# Patient Record
Sex: FEMALE | Race: BLACK OR AFRICAN AMERICAN | NOT HISPANIC OR LATINO
[De-identification: names, ages, dates, MRNs, and addresses within clinical notes are randomized per-mention and may not be internally consistent; named-entity substitution may affect disease eponyms.]

---

## 2019-04-18 ENCOUNTER — APPOINTMENT (OUTPATIENT)
Dept: ANTEPARTUM | Facility: CLINIC | Age: 39
End: 2019-04-18
Payer: COMMERCIAL

## 2019-04-18 PROBLEM — Z00.00 ENCOUNTER FOR PREVENTIVE HEALTH EXAMINATION: Status: ACTIVE | Noted: 2019-04-18

## 2019-04-18 PROCEDURE — 76813 OB US NUCHAL MEAS 1 GEST: CPT

## 2019-04-23 ENCOUNTER — EMERGENCY (EMERGENCY)
Facility: HOSPITAL | Age: 39
LOS: 1 days | Discharge: HOME | End: 2019-04-23
Attending: EMERGENCY MEDICINE | Admitting: EMERGENCY MEDICINE
Payer: COMMERCIAL

## 2019-04-23 VITALS
SYSTOLIC BLOOD PRESSURE: 129 MMHG | WEIGHT: 149.91 LBS | DIASTOLIC BLOOD PRESSURE: 85 MMHG | HEIGHT: 64 IN | TEMPERATURE: 98 F | HEART RATE: 90 BPM | OXYGEN SATURATION: 99 % | RESPIRATION RATE: 16 BRPM

## 2019-04-23 DIAGNOSIS — Z3A.12 12 WEEKS GESTATION OF PREGNANCY: ICD-10-CM

## 2019-04-23 DIAGNOSIS — N93.9 ABNORMAL UTERINE AND VAGINAL BLEEDING, UNSPECIFIED: ICD-10-CM

## 2019-04-23 DIAGNOSIS — O20.9 HEMORRHAGE IN EARLY PREGNANCY, UNSPECIFIED: ICD-10-CM

## 2019-04-23 LAB
BASOPHILS # BLD AUTO: 0.03 K/UL — SIGNIFICANT CHANGE UP (ref 0–0.2)
BASOPHILS NFR BLD AUTO: 0.4 % — SIGNIFICANT CHANGE UP (ref 0–2)
EOSINOPHIL # BLD AUTO: 0.32 K/UL — SIGNIFICANT CHANGE UP (ref 0–0.5)
EOSINOPHIL NFR BLD AUTO: 4 % — SIGNIFICANT CHANGE UP (ref 0–6)
HCT VFR BLD CALC: 34.1 % — LOW (ref 34.5–45)
HGB BLD-MCNC: 11.9 G/DL — SIGNIFICANT CHANGE UP (ref 11.5–15.5)
IMM GRANULOCYTES NFR BLD AUTO: 0.4 % — SIGNIFICANT CHANGE UP (ref 0–1.5)
LYMPHOCYTES # BLD AUTO: 2.42 K/UL — SIGNIFICANT CHANGE UP (ref 1–3.3)
LYMPHOCYTES # BLD AUTO: 30 % — SIGNIFICANT CHANGE UP (ref 13–44)
MCHC RBC-ENTMCNC: 34.4 PG — HIGH (ref 27–34)
MCHC RBC-ENTMCNC: 34.9 GM/DL — SIGNIFICANT CHANGE UP (ref 32–36)
MCV RBC AUTO: 98.6 FL — SIGNIFICANT CHANGE UP (ref 80–100)
MONOCYTES # BLD AUTO: 0.67 K/UL — SIGNIFICANT CHANGE UP (ref 0–0.9)
MONOCYTES NFR BLD AUTO: 8.3 % — SIGNIFICANT CHANGE UP (ref 2–14)
NEUTROPHILS # BLD AUTO: 4.61 K/UL — SIGNIFICANT CHANGE UP (ref 1.8–7.4)
NEUTROPHILS NFR BLD AUTO: 56.9 % — SIGNIFICANT CHANGE UP (ref 43–77)
NRBC # BLD: 0 /100 WBCS — SIGNIFICANT CHANGE UP (ref 0–0)
PLATELET # BLD AUTO: 246 K/UL — SIGNIFICANT CHANGE UP (ref 150–400)
RBC # BLD: 3.46 M/UL — LOW (ref 3.8–5.2)
RBC # FLD: 12.2 % — SIGNIFICANT CHANGE UP (ref 10.3–14.5)
WBC # BLD: 8.08 K/UL — SIGNIFICANT CHANGE UP (ref 3.8–10.5)
WBC # FLD AUTO: 8.08 K/UL — SIGNIFICANT CHANGE UP (ref 3.8–10.5)

## 2019-04-23 PROCEDURE — 86901 BLOOD TYPING SEROLOGIC RH(D): CPT

## 2019-04-23 PROCEDURE — 99284 EMERGENCY DEPT VISIT MOD MDM: CPT | Mod: 25

## 2019-04-23 PROCEDURE — 99283 EMERGENCY DEPT VISIT LOW MDM: CPT

## 2019-04-23 PROCEDURE — 36415 COLL VENOUS BLD VENIPUNCTURE: CPT

## 2019-04-23 PROCEDURE — 80053 COMPREHEN METABOLIC PANEL: CPT

## 2019-04-23 PROCEDURE — 85025 COMPLETE CBC W/AUTO DIFF WBC: CPT

## 2019-04-23 PROCEDURE — 86900 BLOOD TYPING SEROLOGIC ABO: CPT

## 2019-04-23 PROCEDURE — 84702 CHORIONIC GONADOTROPIN TEST: CPT

## 2019-04-23 PROCEDURE — 81001 URINALYSIS AUTO W/SCOPE: CPT

## 2019-04-23 PROCEDURE — 87086 URINE CULTURE/COLONY COUNT: CPT

## 2019-04-23 PROCEDURE — 86850 RBC ANTIBODY SCREEN: CPT

## 2019-04-23 RX ORDER — SODIUM CHLORIDE 9 MG/ML
1000 INJECTION INTRAMUSCULAR; INTRAVENOUS; SUBCUTANEOUS ONCE
Qty: 0 | Refills: 0 | Status: COMPLETED | OUTPATIENT
Start: 2019-04-23 | End: 2019-04-23

## 2019-04-23 RX ADMIN — SODIUM CHLORIDE 1000 MILLILITER(S): 9 INJECTION INTRAMUSCULAR; INTRAVENOUS; SUBCUTANEOUS at 23:31

## 2019-04-23 NOTE — ED PROVIDER NOTE - OBJECTIVE STATEMENT
37 yo F  12 weeks preg with vag bleeding post intercourse 1 hr ago  no N/V - mild lower abd cramping no dizziness or syncope or sob  no active bleeding at this time

## 2019-04-23 NOTE — ED ADULT NURSE NOTE - NSIMPLEMENTINTERV_GEN_ALL_ED
Implemented All Universal Safety Interventions:  [place] to call system. Call bell, personal items and telephone within reach. Instruct patient to call for assistance. Room bathroom lighting operational. Non-slip footwear when patient is off stretcher. Physically safe environment: no spills, clutter or unnecessary equipment. Stretcher in lowest position, wheels locked, appropriate side rails in place.

## 2019-04-23 NOTE — CONSULT NOTE ADULT - SUBJECTIVE AND OBJECTIVE BOX
39 yo  at 12w5d by LMP 19 presents for evaluation of vaginal bleeding. Patient reports the sudden onset of bright red vaginal bleeding 1 hour after intercourse. She has not had any prior episodes of bleeding this pregnancy. She is unsure if she has a placenta previa. She denies abdominal or pelvic pain, she denies leakage of fluid, fever, chills, chest pain, SOB, nausea, and vomiting. Except for feeling very anxious about the bleeding, the patient feels well overall.     OBHx:  x 3; G4 - current; normal [place]; regular prenatal care with Dr. Werner   GYN Hx: Denies   PMHx: Denies   SHx: Breast augmentation   Meds: PNV  Allergies: NKDA     PHYSICAL EXAM:   Vital Signs Last 24 Hrs  T(C): 36.9 (2019 22:23), Max: 36.9 (2019 22:23)  T(F): 98.4 (2019 22:23), Max: 98.4 (2019 22:23)  HR: 90 (2019 22:23) (90 - 90)  BP: 129/85 (2019 22:23) (129/85 - 129/85)  BP(mean): --  RR: 16 (2019 22:23) (16 - 16)  SpO2: 99% (2019 22:23) (99% - 99%)    **************************  Constitutional: Alert & Oriented x3, No acute distress  Gastrointestinal: soft, non tender, positive bowel sounds, no rebound or guarding   Pelvic exam: normal appearing external female genitalia, normal vagina with about 5 cc of dark red blood in the vault, no active bleeding from a parous cervix, no lesions or discharge, no pooling, cervix is closed on bimanual exam, no CMT       LABS:                        11.9   8.08  )-----------( 246      ( 2019 23:27 )             34.1         136  |  101  |  7   ----------------------------<  103<H>  3.6   |  22  |  0.82    Ca    9.3      2019 23:27    TPro  7.0  /  Alb  3.9  /  TBili  <0.2  /  DBili  x   /  AST  14  /  ALT  11  /  AlkPhos  37<L>        Urinalysis Basic - ( 2019 22:48 )    Color: Yellow / Appearance: Clear / SG: <=1.005 / pH: x  Gluc: x / Ketone: NEGATIVE  / Bili: Negative / Urobili: 0.2 E.U./dL   Blood: x / Protein: NEGATIVE mg/dL / Nitrite: NEGATIVE   Leuk Esterase: NEGATIVE / RBC: 5-10 /HPF / WBC 5-10 /HPF   Sq Epi: x / Non Sq Epi: 0-5 /HPF / Bacteria: Present /HPF      HCG Quantitative, Serum: 19949 mIU/mL ( @ 23:27)      RADIOLOGY & ADDITIONAL STUDIES:  Bedside TVUS demonstrates a low lying, posterior placenta with a cervical length of 3.7 cm   TAUS demonstrates normal fetal movement, an appropriate amount of amniotic fluid and a fetal heart rate of 144 bpm

## 2019-04-23 NOTE — ED PROVIDER NOTE - CARE PROVIDER_API CALL
Ernesto Werner)  Obstetrics and Gynecology  [address] [place] [address]  Phone: [phone]  Fax: [phone]  Follow Up Time: 1-3 Days

## 2019-04-23 NOTE — ED PROVIDER NOTE - SHIFT CHANGE DETAILS
if rh neg will need rhogam before dc -- VSS  U/S bedside GYN  wnl-- no vag bleeding Hb wnl--  seen by GYN

## 2019-04-23 NOTE — ED PROVIDER NOTE - CARE PLAN
Principal Discharge DX:	Vaginal bleeding  Secondary Diagnosis:	Pregnancy Principal Discharge DX:	Vaginal bleeding  Secondary Diagnosis:	Pregnancy  Secondary Diagnosis:	Postcoital bleeding

## 2019-04-23 NOTE — ED ADULT NURSE NOTE - CHPI ED NUR SYMPTOMS NEG
no dysuria/no diarrhea/no vomiting/no fever/no chills/no burning urination/no hematuria/no nausea/no blood in stool

## 2019-04-23 NOTE — ED ADULT NURSE NOTE - OBJECTIVE STATEMENT
Patient states 12 wks pregnant, LMP Jan. 24, c/o of vaginal bleed w/ lower abdominal cramping pain after sexual intercourse with partner this evening.  Currently vaginal spotting w/ mild pelvic pain, no dysuria.

## 2019-04-23 NOTE — ED PROVIDER NOTE - NSFOLLOWUPINSTRUCTIONS_ED_ALL_ED_FT
Abnormal Uterine Bleeding  Abnormal uterine bleeding is unusual bleeding from the uterus. It includes:    Bleeding or spotting between periods.  Bleeding after sex.  Bleeding that is heavier than normal.  Periods that last longer than usual.  Bleeding after menopause.    Abnormal uterine bleeding can affect women at various stages in life, including teenagers, women in their reproductive years, pregnant women, and women who have reached menopause. Common causes of abnormal uterine bleeding include:    Pregnancy.  Growths of tissue (polyps).  A noncancerous tumor in the uterus (fibroid).  Infection.  Cancer.  Hormonal imbalances.    ImageAny type of abnormal bleeding should be evaluated by a health care provider. Many cases are minor and simple to treat, while others are more serious. Treatment will depend on the cause of the bleeding.    Follow these instructions at home:  Monitor your condition for any changes.  Do not use tampons, douche, or have sex if told by your health care provider.  Change your pads often.  Get regular exams that include pelvic exams and cervical cancer screening.  Keep all follow-up visits as told by your health care provider. This is important.  Contact a health care provider if:  Your bleeding lasts for more than one week.  You feel dizzy at times.  You feel nauseous or you vomit.  Get help right away if:  You pass out.  Your bleeding soaks through a pad every hour.  You have abdominal pain.  You have a fever.  You become sweaty or weak.  You pass large blood clots from your vagina.  Summary  Abnormal uterine bleeding is unusual bleeding from the uterus.  Any type of abnormal bleeding should be evaluated by a health care provider. Many cases are minor and simple to treat, while others are more serious.  Treatment will depend on the cause of the bleeding.  This information is not intended to replace advice given to you by your health care provider. Make sure you discuss any questions you have with your health care provider.

## 2019-04-23 NOTE — CONSULT NOTE ADULT - ASSESSMENT
37 yo  at 12w5d by LMP 19 presents for evaluation of vaginal bleeding.   - No acute gyn intervention needed at this time.   - Reassuring US findings.   - Recommend strict pelvic rest and avoidance of heavy lifting until cleared by primary OBGYN.   - Continue routine prenatal care.   - Patient to follow up in the office on Friday,  with Dr. Ying Yanes   - Normal labs  - No need for Rhogam as patient is Rh positive   - Return for severe pain, saturating >1 pad per hour     Patient discussed with Dr. Werner - attending in house.

## 2019-04-23 NOTE — ED PROVIDER NOTE - CLINICAL SUMMARY MEDICAL DECISION MAKING FREE TEXT BOX
37 yo F  12 weeks pregnant with post coital bleeding  VSS - GYN to evaluate -- not anemic- not orthostatic  await US/GYN eval 39 yo F  12 weeks pregnant with post coital bleeding  VSS - GYN to evaluate -- not anemic- not orthostatic  await US/GYN eval await T/S if RH neg will need rhogam and dc

## 2019-04-24 VITALS
HEART RATE: 81 BPM | DIASTOLIC BLOOD PRESSURE: 76 MMHG | TEMPERATURE: 98 F | SYSTOLIC BLOOD PRESSURE: 117 MMHG | RESPIRATION RATE: 16 BRPM | OXYGEN SATURATION: 99 %

## 2019-04-24 LAB
ALBUMIN SERPL ELPH-MCNC: 3.9 G/DL — SIGNIFICANT CHANGE UP (ref 3.3–5)
ALP SERPL-CCNC: 37 U/L — LOW (ref 40–120)
ALT FLD-CCNC: 11 U/L — SIGNIFICANT CHANGE UP (ref 10–45)
ANION GAP SERPL CALC-SCNC: 13 MMOL/L — SIGNIFICANT CHANGE UP (ref 5–17)
AST SERPL-CCNC: 14 U/L — SIGNIFICANT CHANGE UP (ref 10–40)
BILIRUB SERPL-MCNC: <0.2 MG/DL — SIGNIFICANT CHANGE UP (ref 0.2–1.2)
BLD GP AB SCN SERPL QL: NEGATIVE — SIGNIFICANT CHANGE UP
BLD GP AB SCN SERPL QL: NEGATIVE — SIGNIFICANT CHANGE UP
BUN SERPL-MCNC: 7 MG/DL — SIGNIFICANT CHANGE UP (ref 7–23)
CALCIUM SERPL-MCNC: 9.3 MG/DL — SIGNIFICANT CHANGE UP (ref 8.4–10.5)
CHLORIDE SERPL-SCNC: 101 MMOL/L — SIGNIFICANT CHANGE UP (ref 96–108)
CO2 SERPL-SCNC: 22 MMOL/L — SIGNIFICANT CHANGE UP (ref 22–31)
CREAT SERPL-MCNC: 0.82 MG/DL — SIGNIFICANT CHANGE UP (ref 0.5–1.3)
GLUCOSE SERPL-MCNC: 103 MG/DL — HIGH (ref 70–99)
HCG SERPL-ACNC: HIGH MIU/ML
POTASSIUM SERPL-MCNC: 3.6 MMOL/L — SIGNIFICANT CHANGE UP (ref 3.5–5.3)
POTASSIUM SERPL-SCNC: 3.6 MMOL/L — SIGNIFICANT CHANGE UP (ref 3.5–5.3)
PROT SERPL-MCNC: 7 G/DL — SIGNIFICANT CHANGE UP (ref 6–8.3)
RH IG SCN BLD-IMP: POSITIVE — SIGNIFICANT CHANGE UP
RH IG SCN BLD-IMP: POSITIVE — SIGNIFICANT CHANGE UP
SODIUM SERPL-SCNC: 136 MMOL/L — SIGNIFICANT CHANGE UP (ref 135–145)

## 2019-05-17 ENCOUNTER — APPOINTMENT (OUTPATIENT)
Dept: ANTEPARTUM | Facility: CLINIC | Age: 39
End: 2019-05-17
Payer: COMMERCIAL

## 2019-05-17 PROCEDURE — 76811 OB US DETAILED SNGL FETUS: CPT

## 2019-05-21 ENCOUNTER — APPOINTMENT (OUTPATIENT)
Dept: ANTEPARTUM | Facility: CLINIC | Age: 39
End: 2019-05-21
Payer: COMMERCIAL

## 2019-05-21 ENCOUNTER — TRANSCRIPTION ENCOUNTER (OUTPATIENT)
Age: 39
End: 2019-05-21

## 2019-05-21 PROCEDURE — 59000 AMNIOCENTESIS DIAGNOSTIC: CPT

## 2019-05-21 PROCEDURE — 76946 ECHO GUIDE FOR AMNIOCENTESIS: CPT

## 2019-06-14 ENCOUNTER — APPOINTMENT (OUTPATIENT)
Dept: ANTEPARTUM | Facility: CLINIC | Age: 39
End: 2019-06-14
Payer: COMMERCIAL

## 2019-06-14 PROCEDURE — 76816 OB US FOLLOW-UP PER FETUS: CPT

## 2019-08-08 ENCOUNTER — APPOINTMENT (OUTPATIENT)
Dept: ANTEPARTUM | Facility: CLINIC | Age: 39
End: 2019-08-08
Payer: COMMERCIAL

## 2019-08-08 PROCEDURE — 76816 OB US FOLLOW-UP PER FETUS: CPT

## 2019-09-05 ENCOUNTER — APPOINTMENT (OUTPATIENT)
Dept: ANTEPARTUM | Facility: CLINIC | Age: 39
End: 2019-09-05
Payer: COMMERCIAL

## 2019-09-05 PROCEDURE — 76816 OB US FOLLOW-UP PER FETUS: CPT

## 2019-09-27 ENCOUNTER — APPOINTMENT (OUTPATIENT)
Dept: ANTEPARTUM | Facility: CLINIC | Age: 39
End: 2019-09-27
Payer: COMMERCIAL

## 2019-09-27 ENCOUNTER — OUTPATIENT (OUTPATIENT)
Dept: OUTPATIENT SERVICES | Facility: HOSPITAL | Age: 39
LOS: 1 days | End: 2019-09-27
Payer: COMMERCIAL

## 2019-09-27 DIAGNOSIS — Z3A.00 WEEKS OF GESTATION OF PREGNANCY NOT SPECIFIED: ICD-10-CM

## 2019-09-27 DIAGNOSIS — O26.899 OTHER SPECIFIED PREGNANCY RELATED CONDITIONS, UNSPECIFIED TRIMESTER: ICD-10-CM

## 2019-09-27 PROCEDURE — 99214 OFFICE O/P EST MOD 30 MIN: CPT

## 2019-09-27 PROCEDURE — 76819 FETAL BIOPHYS PROFIL W/O NST: CPT

## 2019-10-11 ENCOUNTER — APPOINTMENT (OUTPATIENT)
Dept: ANTEPARTUM | Facility: CLINIC | Age: 39
End: 2019-10-11
Payer: COMMERCIAL

## 2019-10-11 PROCEDURE — 76819 FETAL BIOPHYS PROFIL W/O NST: CPT

## 2019-10-15 ENCOUNTER — INPATIENT (INPATIENT)
Facility: HOSPITAL | Age: 39
LOS: 1 days | Discharge: HOME | End: 2019-10-17
Attending: OBSTETRICS & GYNECOLOGY | Admitting: OBSTETRICS & GYNECOLOGY
Payer: COMMERCIAL

## 2019-10-15 ENCOUNTER — RESULT REVIEW (OUTPATIENT)
Age: 39
End: 2019-10-15

## 2019-10-15 VITALS — WEIGHT: 174.17 LBS | HEIGHT: 64 IN

## 2019-10-15 DIAGNOSIS — Z3A.00 WEEKS OF GESTATION OF PREGNANCY NOT SPECIFIED: ICD-10-CM

## 2019-10-15 DIAGNOSIS — O26.899 OTHER SPECIFIED PREGNANCY RELATED CONDITIONS, UNSPECIFIED TRIMESTER: ICD-10-CM

## 2019-10-15 LAB
ALBUMIN SERPL ELPH-MCNC: 3.7 G/DL — SIGNIFICANT CHANGE UP (ref 3.3–5)
ALP SERPL-CCNC: 246 U/L — HIGH (ref 40–120)
ALT FLD-CCNC: 13 U/L — SIGNIFICANT CHANGE UP (ref 10–45)
ANION GAP SERPL CALC-SCNC: 15 MMOL/L — SIGNIFICANT CHANGE UP (ref 5–17)
APPEARANCE UR: CLEAR — SIGNIFICANT CHANGE UP
APTT BLD: 28.3 SEC — SIGNIFICANT CHANGE UP (ref 27.5–36.3)
AST SERPL-CCNC: 21 U/L — SIGNIFICANT CHANGE UP (ref 10–40)
BASOPHILS # BLD AUTO: 0.03 K/UL — SIGNIFICANT CHANGE UP (ref 0–0.2)
BASOPHILS NFR BLD AUTO: 0.5 % — SIGNIFICANT CHANGE UP (ref 0–2)
BILIRUB SERPL-MCNC: 0.4 MG/DL — SIGNIFICANT CHANGE UP (ref 0.2–1.2)
BILIRUB UR-MCNC: NEGATIVE — SIGNIFICANT CHANGE UP
BUN SERPL-MCNC: 6 MG/DL — LOW (ref 7–23)
CALCIUM SERPL-MCNC: 9.8 MG/DL — SIGNIFICANT CHANGE UP (ref 8.4–10.5)
CHLORIDE SERPL-SCNC: 103 MMOL/L — SIGNIFICANT CHANGE UP (ref 96–108)
CO2 SERPL-SCNC: 20 MMOL/L — LOW (ref 22–31)
COLOR SPEC: YELLOW — SIGNIFICANT CHANGE UP
CREAT ?TM UR-MCNC: 25 MG/DL — SIGNIFICANT CHANGE UP
CREAT SERPL-MCNC: 0.63 MG/DL — SIGNIFICANT CHANGE UP (ref 0.5–1.3)
DIFF PNL FLD: ABNORMAL
EOSINOPHIL # BLD AUTO: 0.03 K/UL — SIGNIFICANT CHANGE UP (ref 0–0.5)
EOSINOPHIL NFR BLD AUTO: 0.5 % — SIGNIFICANT CHANGE UP (ref 0–6)
FIBRINOGEN PPP-MCNC: 612 MG/DL — HIGH (ref 258–438)
GLUCOSE SERPL-MCNC: 75 MG/DL — SIGNIFICANT CHANGE UP (ref 70–99)
GLUCOSE UR QL: NEGATIVE — SIGNIFICANT CHANGE UP
HCT VFR BLD CALC: 36.3 % — SIGNIFICANT CHANGE UP (ref 34.5–45)
HGB BLD-MCNC: 12.2 G/DL — SIGNIFICANT CHANGE UP (ref 11.5–15.5)
IMM GRANULOCYTES NFR BLD AUTO: 0.8 % — SIGNIFICANT CHANGE UP (ref 0–1.5)
INR BLD: 0.96 — SIGNIFICANT CHANGE UP (ref 0.88–1.16)
KETONES UR-MCNC: NEGATIVE — SIGNIFICANT CHANGE UP
LDH SERPL L TO P-CCNC: 202 U/L — SIGNIFICANT CHANGE UP (ref 50–242)
LEUKOCYTE ESTERASE UR-ACNC: NEGATIVE — SIGNIFICANT CHANGE UP
LYMPHOCYTES # BLD AUTO: 1.4 K/UL — SIGNIFICANT CHANGE UP (ref 1–3.3)
LYMPHOCYTES # BLD AUTO: 21.4 % — SIGNIFICANT CHANGE UP (ref 13–44)
MCHC RBC-ENTMCNC: 33.2 PG — SIGNIFICANT CHANGE UP (ref 27–34)
MCHC RBC-ENTMCNC: 33.6 GM/DL — SIGNIFICANT CHANGE UP (ref 32–36)
MCV RBC AUTO: 98.9 FL — SIGNIFICANT CHANGE UP (ref 80–100)
MONOCYTES # BLD AUTO: 0.57 K/UL — SIGNIFICANT CHANGE UP (ref 0–0.9)
MONOCYTES NFR BLD AUTO: 8.7 % — SIGNIFICANT CHANGE UP (ref 2–14)
NEUTROPHILS # BLD AUTO: 4.47 K/UL — SIGNIFICANT CHANGE UP (ref 1.8–7.4)
NEUTROPHILS NFR BLD AUTO: 68.1 % — SIGNIFICANT CHANGE UP (ref 43–77)
NITRITE UR-MCNC: NEGATIVE — SIGNIFICANT CHANGE UP
NRBC # BLD: 0 /100 WBCS — SIGNIFICANT CHANGE UP (ref 0–0)
PH UR: 6.5 — SIGNIFICANT CHANGE UP (ref 5–8)
PLATELET # BLD AUTO: 363 K/UL — SIGNIFICANT CHANGE UP (ref 150–400)
POTASSIUM SERPL-MCNC: 4 MMOL/L — SIGNIFICANT CHANGE UP (ref 3.5–5.3)
POTASSIUM SERPL-SCNC: 4 MMOL/L — SIGNIFICANT CHANGE UP (ref 3.5–5.3)
PROT ?TM UR-MCNC: 8 MG/DL — SIGNIFICANT CHANGE UP (ref 0–12)
PROT SERPL-MCNC: 7.7 G/DL — SIGNIFICANT CHANGE UP (ref 6–8.3)
PROT UR-MCNC: NEGATIVE MG/DL — SIGNIFICANT CHANGE UP
PROT/CREAT UR-RTO: 0.3 RATIO — HIGH (ref 0–0.2)
PROTHROM AB SERPL-ACNC: 10.8 SEC — SIGNIFICANT CHANGE UP (ref 10–12.9)
RBC # BLD: 3.67 M/UL — LOW (ref 3.8–5.2)
RBC # FLD: 13.2 % — SIGNIFICANT CHANGE UP (ref 10.3–14.5)
SODIUM SERPL-SCNC: 138 MMOL/L — SIGNIFICANT CHANGE UP (ref 135–145)
SP GR SPEC: <=1.005 — SIGNIFICANT CHANGE UP (ref 1–1.03)
URATE SERPL-MCNC: 4.8 MG/DL — SIGNIFICANT CHANGE UP (ref 2.5–7)
UROBILINOGEN FLD QL: 0.2 E.U./DL — SIGNIFICANT CHANGE UP
WBC # BLD: 6.55 K/UL — SIGNIFICANT CHANGE UP (ref 3.8–10.5)
WBC # FLD AUTO: 6.55 K/UL — SIGNIFICANT CHANGE UP (ref 3.8–10.5)

## 2019-10-15 RX ORDER — TETANUS TOXOID, REDUCED DIPHTHERIA TOXOID AND ACELLULAR PERTUSSIS VACCINE, ADSORBED 5; 2.5; 8; 8; 2.5 [IU]/.5ML; [IU]/.5ML; UG/.5ML; UG/.5ML; UG/.5ML
0.5 SUSPENSION INTRAMUSCULAR ONCE
Refills: 0 | Status: DISCONTINUED | OUTPATIENT
Start: 2019-10-15 | End: 2019-10-17

## 2019-10-15 RX ORDER — AER TRAVELER 0.5 G/1
1 SOLUTION RECTAL; TOPICAL EVERY 4 HOURS
Refills: 0 | Status: DISCONTINUED | OUTPATIENT
Start: 2019-10-15 | End: 2019-10-17

## 2019-10-15 RX ORDER — GLYCERIN ADULT
1 SUPPOSITORY, RECTAL RECTAL AT BEDTIME
Refills: 0 | Status: DISCONTINUED | OUTPATIENT
Start: 2019-10-15 | End: 2019-10-17

## 2019-10-15 RX ORDER — BENZOCAINE 10 %
1 GEL (GRAM) MUCOUS MEMBRANE EVERY 6 HOURS
Refills: 0 | Status: DISCONTINUED | OUTPATIENT
Start: 2019-10-15 | End: 2019-10-17

## 2019-10-15 RX ORDER — FENTANYL/BUPIVACAINE/NS/PF 2MCG/ML-.1
250 PLASTIC BAG, INJECTION (ML) INJECTION
Refills: 0 | Status: DISCONTINUED | OUTPATIENT
Start: 2019-10-15 | End: 2019-10-15

## 2019-10-15 RX ORDER — SODIUM CHLORIDE 9 MG/ML
3 INJECTION INTRAMUSCULAR; INTRAVENOUS; SUBCUTANEOUS EVERY 8 HOURS
Refills: 0 | Status: DISCONTINUED | OUTPATIENT
Start: 2019-10-15 | End: 2019-10-17

## 2019-10-15 RX ORDER — CITRIC ACID/SODIUM CITRATE 300-500 MG
15 SOLUTION, ORAL ORAL EVERY 6 HOURS
Refills: 0 | Status: DISCONTINUED | OUTPATIENT
Start: 2019-10-15 | End: 2019-10-15

## 2019-10-15 RX ORDER — OXYTOCIN 10 UNIT/ML
2 VIAL (ML) INJECTION
Qty: 30 | Refills: 0 | Status: DISCONTINUED | OUTPATIENT
Start: 2019-10-15 | End: 2019-10-15

## 2019-10-15 RX ORDER — HYDROCORTISONE 1 %
1 OINTMENT (GRAM) TOPICAL EVERY 6 HOURS
Refills: 0 | Status: DISCONTINUED | OUTPATIENT
Start: 2019-10-15 | End: 2019-10-17

## 2019-10-15 RX ORDER — SODIUM CHLORIDE 9 MG/ML
1000 INJECTION, SOLUTION INTRAVENOUS
Refills: 0 | Status: DISCONTINUED | OUTPATIENT
Start: 2019-10-15 | End: 2019-10-15

## 2019-10-15 RX ORDER — OXYCODONE HYDROCHLORIDE 5 MG/1
5 TABLET ORAL
Refills: 0 | Status: DISCONTINUED | OUTPATIENT
Start: 2019-10-15 | End: 2019-10-17

## 2019-10-15 RX ORDER — OXYTOCIN 10 UNIT/ML
333.33 VIAL (ML) INJECTION
Qty: 20 | Refills: 0 | Status: DISCONTINUED | OUTPATIENT
Start: 2019-10-15 | End: 2019-10-15

## 2019-10-15 RX ORDER — PENICILLIN G POTASSIUM 5000000 [IU]/1
2.5 POWDER, FOR SOLUTION INTRAMUSCULAR; INTRAPLEURAL; INTRATHECAL; INTRAVENOUS EVERY 4 HOURS
Refills: 0 | Status: DISCONTINUED | OUTPATIENT
Start: 2019-10-15 | End: 2019-10-15

## 2019-10-15 RX ORDER — ACETAMINOPHEN 500 MG
975 TABLET ORAL
Refills: 0 | Status: DISCONTINUED | OUTPATIENT
Start: 2019-10-15 | End: 2019-10-17

## 2019-10-15 RX ORDER — DOCUSATE SODIUM 100 MG
100 CAPSULE ORAL
Refills: 0 | Status: DISCONTINUED | OUTPATIENT
Start: 2019-10-15 | End: 2019-10-17

## 2019-10-15 RX ORDER — KETOROLAC TROMETHAMINE 30 MG/ML
30 SYRINGE (ML) INJECTION ONCE
Refills: 0 | Status: DISCONTINUED | OUTPATIENT
Start: 2019-10-15 | End: 2019-10-15

## 2019-10-15 RX ORDER — PRAMOXINE HYDROCHLORIDE 150 MG/15G
1 AEROSOL, FOAM RECTAL EVERY 4 HOURS
Refills: 0 | Status: DISCONTINUED | OUTPATIENT
Start: 2019-10-15 | End: 2019-10-17

## 2019-10-15 RX ORDER — PENICILLIN G POTASSIUM 5000000 [IU]/1
POWDER, FOR SOLUTION INTRAMUSCULAR; INTRAPLEURAL; INTRATHECAL; INTRAVENOUS
Refills: 0 | Status: DISCONTINUED | OUTPATIENT
Start: 2019-10-15 | End: 2019-10-15

## 2019-10-15 RX ORDER — OXYTOCIN 10 UNIT/ML
333.33 VIAL (ML) INJECTION
Qty: 20 | Refills: 0 | Status: DISCONTINUED | OUTPATIENT
Start: 2019-10-15 | End: 2019-10-17

## 2019-10-15 RX ORDER — MAGNESIUM HYDROXIDE 400 MG/1
30 TABLET, CHEWABLE ORAL
Refills: 0 | Status: DISCONTINUED | OUTPATIENT
Start: 2019-10-15 | End: 2019-10-17

## 2019-10-15 RX ORDER — IBUPROFEN 200 MG
600 TABLET ORAL EVERY 6 HOURS
Refills: 0 | Status: COMPLETED | OUTPATIENT
Start: 2019-10-15 | End: 2020-09-12

## 2019-10-15 RX ORDER — DIBUCAINE 1 %
1 OINTMENT (GRAM) RECTAL EVERY 6 HOURS
Refills: 0 | Status: DISCONTINUED | OUTPATIENT
Start: 2019-10-15 | End: 2019-10-17

## 2019-10-15 RX ORDER — LANOLIN
1 OINTMENT (GRAM) TOPICAL EVERY 6 HOURS
Refills: 0 | Status: DISCONTINUED | OUTPATIENT
Start: 2019-10-15 | End: 2019-10-17

## 2019-10-15 RX ORDER — PENICILLIN G POTASSIUM 5000000 [IU]/1
5 POWDER, FOR SOLUTION INTRAMUSCULAR; INTRAPLEURAL; INTRATHECAL; INTRAVENOUS ONCE
Refills: 0 | Status: COMPLETED | OUTPATIENT
Start: 2019-10-15 | End: 2019-10-15

## 2019-10-15 RX ORDER — DIPHENHYDRAMINE HCL 50 MG
25 CAPSULE ORAL EVERY 6 HOURS
Refills: 0 | Status: DISCONTINUED | OUTPATIENT
Start: 2019-10-15 | End: 2019-10-17

## 2019-10-15 RX ORDER — OXYCODONE HYDROCHLORIDE 5 MG/1
5 TABLET ORAL ONCE
Refills: 0 | Status: DISCONTINUED | OUTPATIENT
Start: 2019-10-15 | End: 2019-10-17

## 2019-10-15 RX ORDER — SIMETHICONE 80 MG/1
80 TABLET, CHEWABLE ORAL EVERY 4 HOURS
Refills: 0 | Status: DISCONTINUED | OUTPATIENT
Start: 2019-10-15 | End: 2019-10-17

## 2019-10-15 RX ADMIN — PENICILLIN G POTASSIUM 100 MILLION UNIT(S): 5000000 POWDER, FOR SOLUTION INTRAMUSCULAR; INTRAPLEURAL; INTRATHECAL; INTRAVENOUS at 14:36

## 2019-10-15 RX ADMIN — Medication 1000 MILLIUNIT(S)/MIN: at 21:54

## 2019-10-15 RX ADMIN — SODIUM CHLORIDE 125 MILLILITER(S): 9 INJECTION, SOLUTION INTRAVENOUS at 14:36

## 2019-10-15 RX ADMIN — PENICILLIN G POTASSIUM 100 MILLION UNIT(S): 5000000 POWDER, FOR SOLUTION INTRAMUSCULAR; INTRAPLEURAL; INTRATHECAL; INTRAVENOUS at 18:34

## 2019-10-15 RX ADMIN — Medication 30 MILLIGRAM(S): at 21:45

## 2019-10-16 LAB
ALBUMIN SERPL ELPH-MCNC: 2.5 G/DL — LOW (ref 3.3–5)
ALP SERPL-CCNC: 177 U/L — HIGH (ref 40–120)
ALT FLD-CCNC: 10 U/L — SIGNIFICANT CHANGE UP (ref 10–45)
ANION GAP SERPL CALC-SCNC: 11 MMOL/L — SIGNIFICANT CHANGE UP (ref 5–17)
AST SERPL-CCNC: 15 U/L — SIGNIFICANT CHANGE UP (ref 10–40)
BILIRUB SERPL-MCNC: 0.3 MG/DL — SIGNIFICANT CHANGE UP (ref 0.2–1.2)
BUN SERPL-MCNC: 7 MG/DL — SIGNIFICANT CHANGE UP (ref 7–23)
CALCIUM SERPL-MCNC: 9 MG/DL — SIGNIFICANT CHANGE UP (ref 8.4–10.5)
CHLORIDE SERPL-SCNC: 107 MMOL/L — SIGNIFICANT CHANGE UP (ref 96–108)
CO2 SERPL-SCNC: 19 MMOL/L — LOW (ref 22–31)
CREAT SERPL-MCNC: 0.75 MG/DL — SIGNIFICANT CHANGE UP (ref 0.5–1.3)
GLUCOSE SERPL-MCNC: 95 MG/DL — SIGNIFICANT CHANGE UP (ref 70–99)
HCT VFR BLD CALC: 31.3 % — LOW (ref 34.5–45)
HGB BLD-MCNC: 10.3 G/DL — LOW (ref 11.5–15.5)
MCHC RBC-ENTMCNC: 32.5 PG — SIGNIFICANT CHANGE UP (ref 27–34)
MCHC RBC-ENTMCNC: 32.9 GM/DL — SIGNIFICANT CHANGE UP (ref 32–36)
MCV RBC AUTO: 98.7 FL — SIGNIFICANT CHANGE UP (ref 80–100)
NRBC # BLD: 0 /100 WBCS — SIGNIFICANT CHANGE UP (ref 0–0)
PLATELET # BLD AUTO: 288 K/UL — SIGNIFICANT CHANGE UP (ref 150–400)
POTASSIUM SERPL-MCNC: 3.9 MMOL/L — SIGNIFICANT CHANGE UP (ref 3.5–5.3)
POTASSIUM SERPL-SCNC: 3.9 MMOL/L — SIGNIFICANT CHANGE UP (ref 3.5–5.3)
PROT SERPL-MCNC: 5.6 G/DL — LOW (ref 6–8.3)
RBC # BLD: 3.17 M/UL — LOW (ref 3.8–5.2)
RBC # FLD: 13.2 % — SIGNIFICANT CHANGE UP (ref 10.3–14.5)
SODIUM SERPL-SCNC: 137 MMOL/L — SIGNIFICANT CHANGE UP (ref 135–145)
T PALLIDUM AB TITR SER: NEGATIVE — SIGNIFICANT CHANGE UP
WBC # BLD: 9.09 K/UL — SIGNIFICANT CHANGE UP (ref 3.8–10.5)
WBC # FLD AUTO: 9.09 K/UL — SIGNIFICANT CHANGE UP (ref 3.8–10.5)

## 2019-10-16 RX ORDER — IBUPROFEN 200 MG
600 TABLET ORAL EVERY 6 HOURS
Refills: 0 | Status: DISCONTINUED | OUTPATIENT
Start: 2019-10-16 | End: 2019-10-17

## 2019-10-16 RX ADMIN — Medication 1 TABLET(S): at 12:02

## 2019-10-16 RX ADMIN — Medication 975 MILLIGRAM(S): at 21:51

## 2019-10-16 RX ADMIN — Medication 600 MILLIGRAM(S): at 06:48

## 2019-10-16 RX ADMIN — Medication 600 MILLIGRAM(S): at 18:25

## 2019-10-16 RX ADMIN — Medication 975 MILLIGRAM(S): at 16:20

## 2019-10-16 RX ADMIN — SODIUM CHLORIDE 3 MILLILITER(S): 9 INJECTION INTRAMUSCULAR; INTRAVENOUS; SUBCUTANEOUS at 06:40

## 2019-10-16 RX ADMIN — Medication 975 MILLIGRAM(S): at 15:28

## 2019-10-16 RX ADMIN — Medication 100 MILLIGRAM(S): at 12:02

## 2019-10-16 RX ADMIN — Medication 600 MILLIGRAM(S): at 19:20

## 2019-10-16 RX ADMIN — Medication 975 MILLIGRAM(S): at 10:14

## 2019-10-16 RX ADMIN — OXYCODONE HYDROCHLORIDE 5 MILLIGRAM(S): 5 TABLET ORAL at 18:26

## 2019-10-16 RX ADMIN — Medication 600 MILLIGRAM(S): at 13:00

## 2019-10-16 RX ADMIN — Medication 600 MILLIGRAM(S): at 12:02

## 2019-10-16 RX ADMIN — Medication 975 MILLIGRAM(S): at 22:11

## 2019-10-16 RX ADMIN — Medication 975 MILLIGRAM(S): at 09:21

## 2019-10-16 RX ADMIN — Medication 600 MILLIGRAM(S): at 07:48

## 2019-10-16 RX ADMIN — OXYCODONE HYDROCHLORIDE 5 MILLIGRAM(S): 5 TABLET ORAL at 17:16

## 2019-10-16 NOTE — PROGRESS NOTE ADULT - SUBJECTIVE AND OBJECTIVE BOX
Patient evaluated at bedside this morning, resting comfortable in bed, no acute events overnight.  She reports pain is well controlled with tylenol and motrin  She denies headache, dizziness, chest pain, palpitations, shortness of breath, nausea, vomiting, heavy vaginal bleeding or perineal discomfort. Reports decrease in amount of vaginal bleeding and denies clots.  She has been ambulating without assistance, voiding spontaneously, and is breastfeeding.   Tolerating food well, without nausea/vomit.  Passing flatus.     Physical Exam:  T(C): 36.8 (10-16-19 @ 02:40), Max: 37 (10-15-19 @ 21:15)  HR: 85 (10-16-19 @ 02:40) (85 - 104)  BP: 125/77 (10-16-19 @ 02:40) (100/74 - 152/84)  RR: 18 (10-16-19 @ 02:40) (17 - 20)  SpO2: 100% (10-16-19 @ 02:40) (100% - 100%)    GA: NAD, A&O x 3  CV: RRR, no murmurs, rubs, or gallops  Pulm: clear breath sounds throughout, no rales, rhonchi, wheezes  Abd: + BS, soft, nontender, nondistended, no rebound or guarding, uterus firm at midline and 1 fb below umbilicus  Perineum: normal lochia, intact, healing well, no hematoma  Extremities: no swelling or calf tenderness                          12.2   6.55  )-----------( 363      ( 15 Oct 2019 14:17 )             36.3     10-15    138  |  103  |  6<L>  ----------------------------<  75  4.0   |  20<L>  |  0.63    Ca    9.8      15 Oct 2019 16:29    TPro  7.7  /  Alb  3.7  /  TBili  0.4  /  DBili  x   /  AST  21  /  ALT  13  /  AlkPhos  246<H>  10-15    acetaminophen   Tablet .. 975 milliGRAM(s) Oral <User Schedule>  benzocaine 20%/menthol 0.5% Spray 1 Spray(s) Topical every 6 hours PRN  dibucaine 1% Ointment 1 Application(s) Topical every 6 hours PRN  diphenhydrAMINE 25 milliGRAM(s) Oral every 6 hours PRN  diphtheria/tetanus/pertussis (acellular) Vaccine (ADAcel) 0.5 milliLiter(s) IntraMuscular once  docusate sodium 100 milliGRAM(s) Oral two times a day PRN  glycerin Suppository - Adult 1 Suppository(s) Rectal at bedtime PRN  hydrocortisone 1% Cream 1 Application(s) Topical every 6 hours PRN  ibuprofen  Tablet. 600 milliGRAM(s) Oral every 6 hours  lanolin Ointment 1 Application(s) Topical every 6 hours PRN  magnesium hydroxide Suspension 30 milliLiter(s) Oral two times a day PRN  oxyCODONE    IR 5 milliGRAM(s) Oral every 3 hours PRN  oxyCODONE    IR 5 milliGRAM(s) Oral once PRN  oxytocin Infusion 333.333 milliUNIT(s)/Min IV Continuous <Continuous>  pramoxine 1%/zinc 5% Cream 1 Application(s) Topical every 4 hours PRN  prenatal multivitamin 1 Tablet(s) Oral daily  simethicone 80 milliGRAM(s) Chew every 4 hours PRN  sodium chloride 0.9% lock flush 3 milliLiter(s) IV Push every 8 hours  witch hazel Pads 1 Application(s) Topical every 4 hours PRN

## 2019-10-16 NOTE — PROGRESS NOTE ADULT - ASSESSMENT
A/P 39y s/p , c/b by PEC w/o SF PPD # 1 , stable, meeting postpartum milestones   - PEC w/o SF - no toxic complaints at this time BP normal to mild range.   - Pain: well controlled on tylenol and motrin  - GI: Tolerating regular diet, colace PRN  - : urinating without difficulty/pain  -DVT prophylaxis: ambulating frequently  -Dispo: PPD 2, unless otherwise specified

## 2019-10-17 ENCOUNTER — TRANSCRIPTION ENCOUNTER (OUTPATIENT)
Age: 39
End: 2019-10-17

## 2019-10-17 VITALS
SYSTOLIC BLOOD PRESSURE: 124 MMHG | HEART RATE: 87 BPM | DIASTOLIC BLOOD PRESSURE: 85 MMHG | RESPIRATION RATE: 20 BRPM | TEMPERATURE: 98 F | OXYGEN SATURATION: 99 %

## 2019-10-17 PROCEDURE — 86901 BLOOD TYPING SEROLOGIC RH(D): CPT

## 2019-10-17 PROCEDURE — 86780 TREPONEMA PALLIDUM: CPT

## 2019-10-17 PROCEDURE — 82570 ASSAY OF URINE CREATININE: CPT

## 2019-10-17 PROCEDURE — 85025 COMPLETE CBC W/AUTO DIFF WBC: CPT

## 2019-10-17 PROCEDURE — 86850 RBC ANTIBODY SCREEN: CPT

## 2019-10-17 PROCEDURE — 80053 COMPREHEN METABOLIC PANEL: CPT

## 2019-10-17 PROCEDURE — 36415 COLL VENOUS BLD VENIPUNCTURE: CPT

## 2019-10-17 PROCEDURE — 85384 FIBRINOGEN ACTIVITY: CPT

## 2019-10-17 PROCEDURE — 84156 ASSAY OF PROTEIN URINE: CPT

## 2019-10-17 PROCEDURE — 86900 BLOOD TYPING SEROLOGIC ABO: CPT

## 2019-10-17 PROCEDURE — 84550 ASSAY OF BLOOD/URIC ACID: CPT

## 2019-10-17 PROCEDURE — 85027 COMPLETE CBC AUTOMATED: CPT

## 2019-10-17 PROCEDURE — 81001 URINALYSIS AUTO W/SCOPE: CPT

## 2019-10-17 PROCEDURE — 85610 PROTHROMBIN TIME: CPT

## 2019-10-17 PROCEDURE — 99214 OFFICE O/P EST MOD 30 MIN: CPT

## 2019-10-17 PROCEDURE — 88307 TISSUE EXAM BY PATHOLOGIST: CPT

## 2019-10-17 PROCEDURE — 85730 THROMBOPLASTIN TIME PARTIAL: CPT

## 2019-10-17 PROCEDURE — 83615 LACTATE (LD) (LDH) ENZYME: CPT

## 2019-10-17 RX ADMIN — Medication 600 MILLIGRAM(S): at 01:04

## 2019-10-17 RX ADMIN — Medication 600 MILLIGRAM(S): at 00:34

## 2019-10-17 RX ADMIN — Medication 975 MILLIGRAM(S): at 04:12

## 2019-10-17 RX ADMIN — OXYCODONE HYDROCHLORIDE 5 MILLIGRAM(S): 5 TABLET ORAL at 05:26

## 2019-10-17 RX ADMIN — Medication 600 MILLIGRAM(S): at 06:30

## 2019-10-17 RX ADMIN — Medication 100 MILLIGRAM(S): at 00:34

## 2019-10-17 RX ADMIN — Medication 975 MILLIGRAM(S): at 02:53

## 2019-10-17 RX ADMIN — Medication 975 MILLIGRAM(S): at 09:41

## 2019-10-17 RX ADMIN — Medication 600 MILLIGRAM(S): at 13:30

## 2019-10-17 RX ADMIN — OXYCODONE HYDROCHLORIDE 5 MILLIGRAM(S): 5 TABLET ORAL at 05:50

## 2019-10-17 RX ADMIN — Medication 600 MILLIGRAM(S): at 06:04

## 2019-10-17 NOTE — PROGRESS NOTE ADULT - ASSESSMENT
A/P 39y s/p , PPD #2  , stable, meeting postpartum milestones   - Pain: well controlled on motrin and tylenol  - GI: Tolerating regular diet, colace PRN  - : urinating without difficulty/pain  - DVT prophylaxis: ambulating frequently  - Dispo: PPD 2, unless otherwise specified A/P 39y s/p , PPD #2  , stable, meeting postpartum milestones   - Pain: well controlled on motrin and tylenol  - PEC w/ SF: s/p magnesium, normotensive, continue q4 VS  - GI: Tolerating regular diet, colace PRN  - : urinating without difficulty/pain  - DVT prophylaxis: ambulating frequently  - Dispo: PPD 2, unless otherwise specified

## 2019-10-17 NOTE — DISCHARGE NOTE OB - PLAN OF CARE
feel well Vaginal delivery, meeting all postpartum milestones.  Follow up in 6 weeks. monitor BP Follow up with your OB in one week for a BP check. Schedule a f/u appointment for 2 weeks. Check bp 3x a day. If bp >160/110, you develop a headache not relieved by tylenol, visual disturbances, or right upper abdominal pain, call your doctor or the hospital, or go to your nearest emergency room. Regular diet, normal activity, nothing in the vagina for 6 weeks--no sex, tampons, tub baths, or swimming pools.

## 2019-10-17 NOTE — DISCHARGE NOTE OB - CARE PLAN
Principal Discharge DX:	Postpartum state  Goal:	feel well  Assessment and plan of treatment:	Vaginal delivery, meeting all postpartum milestones.  Follow up in 6 weeks.  Secondary Diagnosis:	Pre-eclampsia in third trimester  Goal:	monitor BP  Assessment and plan of treatment:	Follow up with your OB in one week for a BP check. Schedule a f/u appointment for 2 weeks. Check bp 3x a day. If bp >160/110, you develop a headache not relieved by tylenol, visual disturbances, or right upper abdominal pain, call your doctor or the hospital, or go to your nearest emergency room. Regular diet, normal activity, nothing in the vagina for 6 weeks--no sex, tampons, tub baths, or swimming pools.

## 2019-10-17 NOTE — PROGRESS NOTE ADULT - SUBJECTIVE AND OBJECTIVE BOX
Patient evaluated at bedside this morning, resting comfortable in bed, no acute events overnight.  She reports pain is well controlled with tylenol and motrin.  She denies headache, dizziness, chest pain, palpitations, shortness of breath, nausea, vomiting, heavy vaginal bleeding or perineal discomfort. Reports decrease in amount of vaginal bleeding and denies clots.  She has been ambulating without assistance, voiding spontaneously, and is breastfeeding.   Tolerating food well, without nausea/vomit.  Passing flatus.     Physical Exam:  T(C): 36.3 (10-17-19 @ 06:08), Max: 36.6 (10-16-19 @ 23:43)  HR: 79 (10-17-19 @ 06:08) (79 - 88)  BP: 132/92 (10-17-19 @ 06:08) (122/82 - 132/92)  RR: 17 (10-17-19 @ 06:08) (17 - 17)  SpO2: 99% (10-17-19 @ 06:08) (98% - 99%)    GA: NAD, A&O x 3  CV: RRR, no murmurs, rubs, or gallops  Pulm: clear breath sounds throughout, no rales, rhonchi, wheezes  Abd: + BS, soft, nontender, nondistended, no rebound or guarding, uterus firm at midline and below umbilicus  Extremities: no swelling or calf tenderness  Perineum: normal lochia, intact, healing well, no hematoma                          10.3   9.09  )-----------( 288      ( 16 Oct 2019 11:04 )             31.3     10-16    137  |  107  |  7   ----------------------------<  95  3.9   |  19<L>  |  0.75    Ca    9.0      16 Oct 2019 11:04    TPro  5.6<L>  /  Alb  2.5<L>  /  TBili  0.3  /  DBili  x   /  AST  15  /  ALT  10  /  AlkPhos  177<H>  10-16    acetaminophen   Tablet .. 975 milliGRAM(s) Oral <User Schedule>  benzocaine 20%/menthol 0.5% Spray 1 Spray(s) Topical every 6 hours PRN  dibucaine 1% Ointment 1 Application(s) Topical every 6 hours PRN  diphenhydrAMINE 25 milliGRAM(s) Oral every 6 hours PRN  diphtheria/tetanus/pertussis (acellular) Vaccine (ADAcel) 0.5 milliLiter(s) IntraMuscular once  docusate sodium 100 milliGRAM(s) Oral two times a day PRN  glycerin Suppository - Adult 1 Suppository(s) Rectal at bedtime PRN  hydrocortisone 1% Cream 1 Application(s) Topical every 6 hours PRN  ibuprofen  Tablet. 600 milliGRAM(s) Oral every 6 hours  lanolin Ointment 1 Application(s) Topical every 6 hours PRN  magnesium hydroxide Suspension 30 milliLiter(s) Oral two times a day PRN  oxyCODONE    IR 5 milliGRAM(s) Oral every 3 hours PRN  oxyCODONE    IR 5 milliGRAM(s) Oral once PRN  oxytocin Infusion 333.333 milliUNIT(s)/Min IV Continuous <Continuous>  pramoxine 1%/zinc 5% Cream 1 Application(s) Topical every 4 hours PRN  prenatal multivitamin 1 Tablet(s) Oral daily  simethicone 80 milliGRAM(s) Chew every 4 hours PRN  sodium chloride 0.9% lock flush 3 milliLiter(s) IV Push every 8 hours  witch hazel Pads 1 Application(s) Topical every 4 hours PRN

## 2019-10-17 NOTE — DISCHARGE NOTE OB - CARE PROVIDER_API CALL
Ernesto Werner)  Obstetrics and Gynecology  [address] [id number]  Phone: [phone]  Fax: [phone]  Follow Up Time:

## 2019-10-17 NOTE — DISCHARGE NOTE OB - PATIENT PORTAL LINK FT
You can access the FollowMyHealth Patient Portal offered by [facility] by registering at the following website: [email or web address]. By joining [employer]’s FollowMyHealth portal, you will also be able to view your health information using other applications (apps) compatible with our system.

## 2019-10-18 ENCOUNTER — APPOINTMENT (OUTPATIENT)
Dept: ANTEPARTUM | Facility: CLINIC | Age: 39
End: 2019-10-18

## 2019-10-21 ENCOUNTER — INPATIENT (INPATIENT)
Facility: HOSPITAL | Age: 39
LOS: 3 days | Discharge: HOME | DRG: 776 | End: 2019-10-25
Attending: OBSTETRICS & GYNECOLOGY | Admitting: OBSTETRICS & GYNECOLOGY
Payer: COMMERCIAL

## 2019-10-21 VITALS
HEART RATE: 86 BPM | OXYGEN SATURATION: 100 % | HEIGHT: 64 IN | SYSTOLIC BLOOD PRESSURE: 157 MMHG | RESPIRATION RATE: 18 BRPM | DIASTOLIC BLOOD PRESSURE: 94 MMHG | WEIGHT: 169.54 LBS | TEMPERATURE: 98 F

## 2019-10-21 DIAGNOSIS — Z3A.38 38 WEEKS GESTATION OF PREGNANCY: ICD-10-CM

## 2019-10-21 DIAGNOSIS — Z28.21 IMMUNIZATION NOT CARRIED OUT BECAUSE OF PATIENT REFUSAL: ICD-10-CM

## 2019-10-21 LAB
ALBUMIN SERPL ELPH-MCNC: 3.5 G/DL — SIGNIFICANT CHANGE UP (ref 3.3–5)
ALP SERPL-CCNC: 147 U/L — HIGH (ref 40–120)
ALT FLD-CCNC: 47 U/L — HIGH (ref 10–45)
ANION GAP SERPL CALC-SCNC: 10 MMOL/L — SIGNIFICANT CHANGE UP (ref 5–17)
APTT BLD: 30.2 SEC — SIGNIFICANT CHANGE UP (ref 27.5–36.3)
AST SERPL-CCNC: 43 U/L — HIGH (ref 10–40)
BILIRUB SERPL-MCNC: 0.4 MG/DL — SIGNIFICANT CHANGE UP (ref 0.2–1.2)
BUN SERPL-MCNC: 13 MG/DL — SIGNIFICANT CHANGE UP (ref 7–23)
CALCIUM SERPL-MCNC: 8.8 MG/DL — SIGNIFICANT CHANGE UP (ref 8.4–10.5)
CHLORIDE SERPL-SCNC: 106 MMOL/L — SIGNIFICANT CHANGE UP (ref 96–108)
CO2 SERPL-SCNC: 24 MMOL/L — SIGNIFICANT CHANGE UP (ref 22–31)
CREAT SERPL-MCNC: 0.75 MG/DL — SIGNIFICANT CHANGE UP (ref 0.5–1.3)
GLUCOSE SERPL-MCNC: 104 MG/DL — HIGH (ref 70–99)
HCT VFR BLD CALC: 36.9 % — SIGNIFICANT CHANGE UP (ref 34.5–45)
HGB BLD-MCNC: 12.1 G/DL — SIGNIFICANT CHANGE UP (ref 11.5–15.5)
INR BLD: 0.97 — SIGNIFICANT CHANGE UP (ref 0.88–1.16)
MAGNESIUM SERPL-MCNC: 5.3 MG/DL — HIGH (ref 1.6–2.6)
MCHC RBC-ENTMCNC: 32.6 PG — SIGNIFICANT CHANGE UP (ref 27–34)
MCHC RBC-ENTMCNC: 32.8 GM/DL — SIGNIFICANT CHANGE UP (ref 32–36)
MCV RBC AUTO: 99.5 FL — SIGNIFICANT CHANGE UP (ref 80–100)
NRBC # BLD: 0 /100 WBCS — SIGNIFICANT CHANGE UP (ref 0–0)
PLATELET # BLD AUTO: 347 K/UL — SIGNIFICANT CHANGE UP (ref 150–400)
POTASSIUM SERPL-MCNC: 3.9 MMOL/L — SIGNIFICANT CHANGE UP (ref 3.5–5.3)
POTASSIUM SERPL-SCNC: 3.9 MMOL/L — SIGNIFICANT CHANGE UP (ref 3.5–5.3)
PROT SERPL-MCNC: 7 G/DL — SIGNIFICANT CHANGE UP (ref 6–8.3)
PROTHROM AB SERPL-ACNC: 11 SEC — SIGNIFICANT CHANGE UP (ref 10–12.9)
RBC # BLD: 3.71 M/UL — LOW (ref 3.8–5.2)
RBC # FLD: 13.7 % — SIGNIFICANT CHANGE UP (ref 10.3–14.5)
SODIUM SERPL-SCNC: 140 MMOL/L — SIGNIFICANT CHANGE UP (ref 135–145)
WBC # BLD: 5.92 K/UL — SIGNIFICANT CHANGE UP (ref 3.8–10.5)
WBC # FLD AUTO: 5.92 K/UL — SIGNIFICANT CHANGE UP (ref 3.8–10.5)

## 2019-10-21 PROCEDURE — 99285 EMERGENCY DEPT VISIT HI MDM: CPT

## 2019-10-21 RX ORDER — LABETALOL HCL 100 MG
200 TABLET ORAL EVERY 12 HOURS
Refills: 0 | Status: DISCONTINUED | OUTPATIENT
Start: 2019-10-22 | End: 2019-10-22

## 2019-10-21 RX ORDER — ACETAMINOPHEN 500 MG
650 TABLET ORAL ONCE
Refills: 0 | Status: COMPLETED | OUTPATIENT
Start: 2019-10-21 | End: 2019-10-21

## 2019-10-21 RX ORDER — SODIUM CHLORIDE 9 MG/ML
1000 INJECTION, SOLUTION INTRAVENOUS
Refills: 0 | Status: DISCONTINUED | OUTPATIENT
Start: 2019-10-21 | End: 2019-10-22

## 2019-10-21 RX ORDER — ACETAMINOPHEN 500 MG
325 TABLET ORAL ONCE
Refills: 0 | Status: COMPLETED | OUTPATIENT
Start: 2019-10-21 | End: 2019-10-21

## 2019-10-21 RX ORDER — MAGNESIUM SULFATE 500 MG/ML
4 VIAL (ML) INJECTION ONCE
Refills: 0 | Status: COMPLETED | OUTPATIENT
Start: 2019-10-21 | End: 2019-10-21

## 2019-10-21 RX ORDER — ACETAMINOPHEN 500 MG
975 TABLET ORAL EVERY 6 HOURS
Refills: 0 | Status: DISCONTINUED | OUTPATIENT
Start: 2019-10-21 | End: 2019-10-25

## 2019-10-21 RX ORDER — MAGNESIUM SULFATE 500 MG/ML
2 VIAL (ML) INJECTION
Qty: 40 | Refills: 0 | Status: DISCONTINUED | OUTPATIENT
Start: 2019-10-21 | End: 2019-10-22

## 2019-10-21 RX ORDER — LABETALOL HCL 100 MG
200 TABLET ORAL ONCE
Refills: 0 | Status: COMPLETED | OUTPATIENT
Start: 2019-10-21 | End: 2019-10-21

## 2019-10-21 RX ADMIN — Medication 200 MILLIGRAM(S): at 14:06

## 2019-10-21 RX ADMIN — Medication 50 GM/HR: at 14:06

## 2019-10-21 RX ADMIN — Medication 200 GRAM(S): at 14:06

## 2019-10-21 RX ADMIN — Medication 650 MILLIGRAM(S): at 14:07

## 2019-10-21 RX ADMIN — Medication 325 MILLIGRAM(S): at 16:56

## 2019-10-21 RX ADMIN — Medication 50 GM/HR: at 15:12

## 2019-10-21 RX ADMIN — Medication 975 MILLIGRAM(S): at 23:08

## 2019-10-21 NOTE — H&P ADULT - HISTORY OF PRESENT ILLNESS
38yo  PPD6 s/p  at 38wks 4d was sent from OB, Dr. Werner's office for rule out preeclampsia. Pt had mild range blood pressures postpartum   which was not treated with any blood pressure medications or magnesium. She was told to check her blood pressures at home and so she did. Pt states her blood pressures at home ranged from 150-170 systolic. She had a dull headache this morning which resolved after she had a bagel. She reports lower extremity swelling of bilateral legs that have not changed since delivery. Pt's prior pregnancies and postpartum course was uncomplicated. She is currently not breastfeeding .   Pt denies fever, chills, chest pain, SOB, abdominal pain, nausea, vomiting, scotomas, or blurry vision.     OB/GYN Hx:      x4-, , , 2019    PMHx: Pt denies   SHx: Breast augmentation   Meds: Prenatal vitamins   Allergies: Pt denies     PHYSICAL EXAM:   Vital Signs Last 24 Hrs  T(C): 36.7 (21 Oct 2019 12:26), Max: 36.7 (21 Oct 2019 12:26)  T(F): 98 (21 Oct 2019 12:26), Max: 98 (21 Oct 2019 12:26)  HR: 79 (21 Oct 2019 13:53) (70 - 86)  BP: 152/72 (21 Oct 2019 13:53) (152/72 - 158/80)  BP(mean): --  RR: 18 (21 Oct 2019 12:26) (18 - 18)  SpO2: 100% (21 Oct 2019 12:26) (100% - 100%)    **************************  Constitutional: Alert & Oriented x3, No acute distress  Respiratory: Clear to ausculation bilaterally  Cardiovascular: regular rate and rhythm  Gastrointestinal: soft, nontender, positive bowel sounds, no rebound or guarding  Extremities: no calf tenderness. +1 pitting edema b/l legs   Normal reflexes     LABS:                        12.1   5.92  )-----------( 347      ( 21 Oct 2019 13:06 )             36.9     10-21    140  |  106  |  13  ----------------------------<  104<H>  3.9   |  24  |  0.75    Ca    8.8      21 Oct 2019 13:06    TPro  7.0  /  Alb  3.5  /  TBili  0.4  /  DBili  x   /  AST  43<H>  /  ALT  47<H>  /  AlkPhos  147<H>  10-21    PT/INR - ( 21 Oct 2019 13:06 )   PT: 11.0 sec;   INR: 0.97        PTT - ( 21 Oct 2019 13:06 )  PTT:30.2 sec    A/P: 38yo  PPD6 s/p  at 38wks 4d admitted for pre-eclampsia. Pt's blood pressures have been in systolic 150s. Labs reveals increased in AST and ALT from last admission. LDH is also elevated to 296. Follow up urine protein/creatinine ratio. Pt to get 24hrs IV magnesium and labetalol 200mg BID. Pt discussed with Dr. Artis.

## 2019-10-21 NOTE — ED ADULT TRIAGE NOTE - CHIEF COMPLAINT QUOTE
pt sent by  OB-GYN for pre-eclampsia. pt reports vaginal deliver 10/15/19. here c/o bilateral arm numbness and headache. reports  bp of 175/105 at home

## 2019-10-21 NOTE — ED PROVIDER NOTE - CLINICAL SUMMARY MEDICAL DECISION MAKING FREE TEXT BOX
39F pmh vaginal delivery 10/15/19 p/w HA this am w/ bue tingling at finger tips only, no weakness, BP at home 174/105. Pt ate bagel and HA resolved. No vision change, no weakness, no numbness, no n/v. No dysuria. BP here elevated. Exam w/o acute add'l findings. Concern preeclampsia, htn, no e/o seizure to suggest eclampsia at this time, UA & protein/creatinine pending. OB recs magnesium and PO labetolol, accepted to their service.

## 2019-10-21 NOTE — ED PROVIDER NOTE - PROGRESS NOTE DETAILS
OB called again, they rec ED eval, OB will see. Aware Dr. Kelly (sp?) sent pt. OB PA recs labetalol, mag, admit

## 2019-10-21 NOTE — ED ADULT NURSE NOTE - OBJECTIVE STATEMENT
38 y/o female received into the ed, axox3, 6 days post partum, stating that she has had elevated bp for the past week and was advised by obgyn to come to the ed today.  Pt. denies any dizziness, pain or discomfort at this moment. All blood work collected and sent to labs.  Pt. dressed in hospital gown, iv line placed, awaiting further medical disposition, will continue to monitor.

## 2019-10-21 NOTE — ED PROVIDER NOTE - PHYSICAL EXAMINATION
VITAL SIGNS: I have reviewed nursing notes and confirm.  CONSTITUTIONAL: Well-developed; well-nourished; in no acute distress.  SKIN: Skin is warm and dry, no acute rash.  HEAD: Normocephalic; atraumatic.  EYES:  EOM intact; conjunctiva and sclera clear.  ENT: No nasal discharge; airway clear.  NECK: Supple; Voluntary FROM  CARD: No rubs appreciated, Regular rate and rhythm.  RESP: No wheezes, no rales. No respiratory distress  ABD: Soft; minimally distended, non-tender; no rebound or guarding  EXT: Normal ROM. No cyanosis or edema.  NEURO: Alert, oriented. Grossly unremarkable.  PSYCH: Cooperative, appropriate.

## 2019-10-21 NOTE — PROGRESS NOTE ADULT - ASSESSMENT
A&P:   39y  s/p ,PP6 readmitted for preeclampsia with severe features (HA)       1. Preeclampsia: Continue IV Magnesium @2G/hr for 24 hrs post delivery.  No complaints currently.   Antihypertensives: labetalol 200 BID  Continue to monitor blood pressures  Next Magnesium check @ 3AM   Follow up on Magnesium serum level     2. GI: regular A&P:   39y  s/p ,PP6 readmitted for preeclampsia with severe features (HA)       1. Preeclampsia: Continue IV Magnesium @2G/hr for 24 hrs post delivery.  6/10 occipital headache, will give tylenol and reassess headache in 1hr  Antihypertensives: labetalol 200 BID  Continue to monitor blood pressures  Next Magnesium check @ 3AM   Follow up on Magnesium serum level     2. GI: regular

## 2019-10-21 NOTE — PROGRESS NOTE ADULT - SUBJECTIVE AND OBJECTIVE BOX
Patient evaluated at bedside for clinical magnesium check. Endorses occipital headache 6/10 which is different than her earlier headache which was frontal and 5/10.   She denies visual disturbances including scotoma, and right upper quadrant pain. Also denies nausea/vomiting/epigastric pain/shortness of breath. Pain well controlled.      T(C): 36.4 (10-21-19 @ 19:05), Max: 36.7 (10-21-19 @ 12:26)  HR: 79 (10-21-19 @ 19:05) (68 - 86)  BP: 147/90 (10-21-19 @ 19:05) (134/76 - 158/80)  RR: 18 (10-21-19 @ 19:05) (18 - 20)  SpO2: 98% (10-21-19 @ 19:05) (98% - 100%)  Wt(kg): --    Gen: NAD  Pulm: CTAB no w/r/r  Abd: soft, nontender, no rebound or guarding, no epigastric tenderness, liver nonpalpable +BS, fundus palpated   Ext: Reflexes 2+ b/l brachioardailis                          12.1   5.92  )-----------( 347      ( 21 Oct 2019 13:06 )             36.9     10-21    140  |  106  |  13  ----------------------------<  104<H>  3.9   |  24  |  0.75    Ca    8.8      21 Oct 2019 13:06  Mg     5.3     10-21    TPro  7.0  /  Alb  3.5  /  TBili  0.4  /  DBili  x   /  AST  43<H>  /  ALT  47<H>  /  AlkPhos  147<H>  10-21        10-21-19 @ 07:01  -  10-21-19 @ 21:03  --------------------------------------------------------  IN: 125 mL / OUT: 1050 mL / NET: -925 mL

## 2019-10-21 NOTE — ED ADULT NURSE NOTE - CAS DISCH ACCOMP BY
Refill request for sprycel.  Verbal order from Dr. Brian to fill one month. Notified Patient that she needs a follow up appointment.  Verbalized understanding.   RN/Transport

## 2019-10-22 LAB
ALBUMIN SERPL ELPH-MCNC: 3.4 G/DL — SIGNIFICANT CHANGE UP (ref 3.3–5)
ALBUMIN SERPL ELPH-MCNC: 3.9 G/DL — SIGNIFICANT CHANGE UP (ref 3.3–5)
ALP SERPL-CCNC: 131 U/L — HIGH (ref 40–120)
ALP SERPL-CCNC: 155 U/L — HIGH (ref 40–120)
ALT FLD-CCNC: 39 U/L — SIGNIFICANT CHANGE UP (ref 10–45)
ALT FLD-CCNC: 44 U/L — SIGNIFICANT CHANGE UP (ref 10–45)
ANION GAP SERPL CALC-SCNC: 12 MMOL/L — SIGNIFICANT CHANGE UP (ref 5–17)
ANION GAP SERPL CALC-SCNC: 12 MMOL/L — SIGNIFICANT CHANGE UP (ref 5–17)
AST SERPL-CCNC: 27 U/L — SIGNIFICANT CHANGE UP (ref 10–40)
AST SERPL-CCNC: 31 U/L — SIGNIFICANT CHANGE UP (ref 10–40)
BILIRUB SERPL-MCNC: 0.3 MG/DL — SIGNIFICANT CHANGE UP (ref 0.2–1.2)
BILIRUB SERPL-MCNC: 0.4 MG/DL — SIGNIFICANT CHANGE UP (ref 0.2–1.2)
BUN SERPL-MCNC: 10 MG/DL — SIGNIFICANT CHANGE UP (ref 7–23)
BUN SERPL-MCNC: 12 MG/DL — SIGNIFICANT CHANGE UP (ref 7–23)
CALCIUM SERPL-MCNC: 7.4 MG/DL — LOW (ref 8.4–10.5)
CALCIUM SERPL-MCNC: 7.4 MG/DL — LOW (ref 8.4–10.5)
CHLORIDE SERPL-SCNC: 100 MMOL/L — SIGNIFICANT CHANGE UP (ref 96–108)
CHLORIDE SERPL-SCNC: 101 MMOL/L — SIGNIFICANT CHANGE UP (ref 96–108)
CO2 SERPL-SCNC: 22 MMOL/L — SIGNIFICANT CHANGE UP (ref 22–31)
CO2 SERPL-SCNC: 24 MMOL/L — SIGNIFICANT CHANGE UP (ref 22–31)
CREAT SERPL-MCNC: 0.79 MG/DL — SIGNIFICANT CHANGE UP (ref 0.5–1.3)
CREAT SERPL-MCNC: 0.82 MG/DL — SIGNIFICANT CHANGE UP (ref 0.5–1.3)
GLUCOSE SERPL-MCNC: 86 MG/DL — SIGNIFICANT CHANGE UP (ref 70–99)
GLUCOSE SERPL-MCNC: 92 MG/DL — SIGNIFICANT CHANGE UP (ref 70–99)
HCT VFR BLD CALC: 40 % — SIGNIFICANT CHANGE UP (ref 34.5–45)
HGB BLD-MCNC: 13.4 G/DL — SIGNIFICANT CHANGE UP (ref 11.5–15.5)
MAGNESIUM SERPL-MCNC: 6.3 MG/DL — HIGH (ref 1.6–2.6)
MAGNESIUM SERPL-MCNC: 7 MG/DL — CRITICAL HIGH (ref 1.6–2.6)
MCHC RBC-ENTMCNC: 33.3 PG — SIGNIFICANT CHANGE UP (ref 27–34)
MCHC RBC-ENTMCNC: 33.5 GM/DL — SIGNIFICANT CHANGE UP (ref 32–36)
MCV RBC AUTO: 99.5 FL — SIGNIFICANT CHANGE UP (ref 80–100)
NRBC # BLD: 0 /100 WBCS — SIGNIFICANT CHANGE UP (ref 0–0)
PLATELET # BLD AUTO: 378 K/UL — SIGNIFICANT CHANGE UP (ref 150–400)
POTASSIUM SERPL-MCNC: 3.7 MMOL/L — SIGNIFICANT CHANGE UP (ref 3.5–5.3)
POTASSIUM SERPL-MCNC: 4.2 MMOL/L — SIGNIFICANT CHANGE UP (ref 3.5–5.3)
POTASSIUM SERPL-SCNC: 3.7 MMOL/L — SIGNIFICANT CHANGE UP (ref 3.5–5.3)
POTASSIUM SERPL-SCNC: 4.2 MMOL/L — SIGNIFICANT CHANGE UP (ref 3.5–5.3)
PROT SERPL-MCNC: 6.3 G/DL — SIGNIFICANT CHANGE UP (ref 6–8.3)
PROT SERPL-MCNC: 7.3 G/DL — SIGNIFICANT CHANGE UP (ref 6–8.3)
RBC # BLD: 4.02 M/UL — SIGNIFICANT CHANGE UP (ref 3.8–5.2)
RBC # FLD: 13.9 % — SIGNIFICANT CHANGE UP (ref 10.3–14.5)
SODIUM SERPL-SCNC: 135 MMOL/L — SIGNIFICANT CHANGE UP (ref 135–145)
SODIUM SERPL-SCNC: 136 MMOL/L — SIGNIFICANT CHANGE UP (ref 135–145)
WBC # BLD: 8.01 K/UL — SIGNIFICANT CHANGE UP (ref 3.8–10.5)
WBC # FLD AUTO: 8.01 K/UL — SIGNIFICANT CHANGE UP (ref 3.8–10.5)

## 2019-10-22 RX ORDER — MAGNESIUM SULFATE 500 MG/ML
0.5 VIAL (ML) INJECTION
Qty: 40 | Refills: 0 | Status: DISCONTINUED | OUTPATIENT
Start: 2019-10-22 | End: 2019-10-22

## 2019-10-22 RX ORDER — LABETALOL HCL 100 MG
100 TABLET ORAL ONCE
Refills: 0 | Status: COMPLETED | OUTPATIENT
Start: 2019-10-22 | End: 2019-10-22

## 2019-10-22 RX ORDER — IBUPROFEN 200 MG
600 TABLET ORAL EVERY 6 HOURS
Refills: 0 | Status: DISCONTINUED | OUTPATIENT
Start: 2019-10-22 | End: 2019-10-23

## 2019-10-22 RX ORDER — LABETALOL HCL 100 MG
300 TABLET ORAL EVERY 8 HOURS
Refills: 0 | Status: DISCONTINUED | OUTPATIENT
Start: 2019-10-23 | End: 2019-10-23

## 2019-10-22 RX ORDER — HYDRALAZINE HCL 50 MG
10 TABLET ORAL ONCE
Refills: 0 | Status: DISCONTINUED | OUTPATIENT
Start: 2019-10-22 | End: 2019-10-23

## 2019-10-22 RX ORDER — MAGNESIUM SULFATE 500 MG/ML
1 VIAL (ML) INJECTION
Qty: 40 | Refills: 0 | Status: DISCONTINUED | OUTPATIENT
Start: 2019-10-22 | End: 2019-10-22

## 2019-10-22 RX ADMIN — Medication 600 MILLIGRAM(S): at 22:05

## 2019-10-22 RX ADMIN — Medication 600 MILLIGRAM(S): at 14:53

## 2019-10-22 RX ADMIN — Medication 975 MILLIGRAM(S): at 00:35

## 2019-10-22 RX ADMIN — Medication 600 MILLIGRAM(S): at 15:45

## 2019-10-22 RX ADMIN — Medication 600 MILLIGRAM(S): at 10:20

## 2019-10-22 RX ADMIN — Medication 100 MILLIGRAM(S): at 23:23

## 2019-10-22 RX ADMIN — Medication 200 MILLIGRAM(S): at 02:17

## 2019-10-22 RX ADMIN — Medication 600 MILLIGRAM(S): at 09:20

## 2019-10-22 RX ADMIN — Medication 975 MILLIGRAM(S): at 04:44

## 2019-10-22 RX ADMIN — Medication 600 MILLIGRAM(S): at 21:38

## 2019-10-22 RX ADMIN — Medication 200 MILLIGRAM(S): at 22:33

## 2019-10-22 RX ADMIN — Medication 200 MILLIGRAM(S): at 13:36

## 2019-10-22 NOTE — PROGRESS NOTE ADULT - SUBJECTIVE AND OBJECTIVE BOX
Patient evaluated at bedside for clinical magnesium check. Patient was sleeping.  No longer complaining of headache.   She denies visual disturbances including scotoma, headache and right upper quadrant pain. Also denies nausea/vomiting/epigastric pain/shortness of breath. Pain well controlled.      T(C): 36.4 (10-22-19 @ 02:05), Max: 36.7 (10-21-19 @ 21:40)  HR: 78 (10-22-19 @ 02:05) (68 - 79)  BP: 142/82 (10-22-19 @ 02:05) (134/76 - 155/89)  RR: 18 (10-22-19 @ 02:05) (18 - 20)  SpO2: 98% (10-22-19 @ 02:05) (97% - 99%)  Wt(kg): --    Gen: NAD  Pulm: CTAB no w/r/r  Abd: soft, nontender, no rebound or guarding, no epigastric tenderness, liver nonpalpable +BS, fundus palpated   Ext: Reflexes 2+ b/l brachioradialis                          12.1   5.92  )-----------( 347      ( 21 Oct 2019 13:06 )             36.9     10-21    140  |  106  |  13  ----------------------------<  104<H>  3.9   |  24  |  0.75    Ca    8.8      21 Oct 2019 13:06  Mg     5.3     10-21    TPro  7.0  /  Alb  3.5  /  TBili  0.4  /  DBili  x   /  AST  43<H>  /  ALT  47<H>  /  AlkPhos  147<H>  10-21        10-21-19 @ 07:01  -  10-22-19 @ 02:39  --------------------------------------------------------  IN: 750 mL / OUT: 1850 mL / NET: -1100 mL

## 2019-10-22 NOTE — PROGRESS NOTE ADULT - ASSESSMENT
A/P 39y s/p , PPD #6, readmitted for PEC with severe features. Stable, meeting postpartum milestones   - Pain: well controlled on tylenol and motrin  - HTN: denies any toxic PEC symptoms, BPs mildly elevated overnight. Current antihypertensive regimen Labetalol 200 BID. On magnesium, next magnesium check at 14:00.  - GI: Tolerating regular diet, colace PRN  - : urinating without difficulty/pain  -DVT prophylaxis: ambulating frequently  -Dispo: pending improvement in condition

## 2019-10-22 NOTE — PROGRESS NOTE ADULT - ASSESSMENT
A&P:   39y  s/p , PP#6, complicated by preeclampsia with severe features      1. Preeclampsia: Continue IV Magnesium @2G/hr for 24 hrs post delivery.  No complaints currently.   Antihypertensives: labetalol 300 BID, Nifedeipine 30mg XL qd  Continue to monitor blood pressures  Next Magnesium check @ 8AM   Follow up on Magnesium serum level

## 2019-10-22 NOTE — PROGRESS NOTE ADULT - SUBJECTIVE AND OBJECTIVE BOX
S: Pt evaluated at bedside for magnesium check. She denies visual disturbances, headache and right upper quadrant pain. Also denies nausea/vomiting/epigastric pain/shortness of breath. Pain well controlled. Only complains of mild breast swelling and tenderness. No additional complaints.    O:  T(C): 36.4 (10-22-19 @ 06:05), Max: 36.7 (10-21-19 @ 21:40)  HR: 82 (10-22-19 @ 06:05) (72 - 82)  BP: 138/82 (10-22-19 @ 06:05) (138/82 - 155/89)  RR: 18 (10-22-19 @ 06:05) (18 - 20)  SpO2: 96% (10-22-19 @ 06:05) (96% - 99%)  Wt(kg): --  Daily Height in cm: 162.56 (21 Oct 2019 12:26)    Daily     10-21 @ 07:01  -  10-22 @ 07:00  --------------------------------------------------------  IN: 1000 mL / OUT: 2750 mL / NET: -1750 mL      Gen: NAD, AAOx3  CV: RRR, no M/R/G  Pulm: CTAB, no R/R/W  Abd: soft, nontender, no rebound or guarding, no epigastric tenderness, liver nonpalpable +BS.   : Galvez   Ext: +1 edema norris, UE/LE Reflexes 2+ b/l    acetaminophen   Tablet .. 975 milliGRAM(s) Oral every 6 hours PRN  labetalol 200 milliGRAM(s) Oral every 12 hours  lactated ringers. 1000 milliLiter(s) IV Continuous <Continuous>  magnesium sulfate Infusion 2 Gm/Hr IV Continuous <Continuous>    No Known Allergies                            12.1   5.92  )-----------( 347      ( 21 Oct 2019 13:06 )             36.9     10-22    135  |  101  |  12  ----------------------------<  86  3.7   |  22  |  0.82    Ca    7.4<L>      22 Oct 2019 02:36  Mg     6.3     10-22    TPro  6.3  /  Alb  3.4  /  TBili  0.3  /  DBili  x   /  AST  27  /  ALT  39  /  AlkPhos  131<H>  10-22

## 2019-10-22 NOTE — PROGRESS NOTE ADULT - SUBJECTIVE AND OBJECTIVE BOX
Patient evaluated at bedside this morning, resting comfortable in bed, no acute events overnight.  She reports pain is well controlled with tylenol and motrin  She denies toxic PEC symptoms including headache, visual changes (including scotoma), chest pain, shortness of breath, numbness/tingling in hands or feet.  She also denies dizziness, palpitations, nausea, vomiting, heavy vaginal bleeding or perineal discomfort.   Reports decrease in amount of vaginal bleeding and denies clots.  She has been ambulating without assistance, voiding spontaneously, and is breastfeeding.   Tolerating food well, without nausea/vomit.  Passing flatus.     Physical Exam:  T(C): 36.4 (10-22-19 @ 06:05), Max: 36.7 (10-21-19 @ 21:40)  HR: 82 (10-22-19 @ 06:05) (72 - 82)  BP: 138/82 (10-22-19 @ 06:05) (138/82 - 155/89)  RR: 18 (10-22-19 @ 06:05) (18 - 20)  SpO2: 96% (10-22-19 @ 06:05) (96% - 99%)    GA: NAD, A&O x 3  CV: RRR, no murmurs, rubs, or gallops  Pulm: clear breath sounds throughout, no rales, rhonchi, wheezes  Abd: + BS, soft, nontender, nondistended, no rebound or guarding, uterus firm at midline, and 2fb below umbilicus  Perineum: intact, minimal swelling, small amount of bleeding on pad, no clots  Extremities: no swelling or calf tenderness                          12.1   5.92  )-----------( 347      ( 21 Oct 2019 13:06 )             36.9     10-22    135  |  101  |  12  ----------------------------<  86  3.7   |  22  |  0.82    Ca    7.4<L>      22 Oct 2019 02:36  Mg     6.3     10-22    TPro  6.3  /  Alb  3.4  /  TBili  0.3  /  DBili  x   /  AST  27  /  ALT  39  /  AlkPhos  131<H>  10-22    acetaminophen   Tablet .. 975 milliGRAM(s) Oral every 6 hours PRN  labetalol 200 milliGRAM(s) Oral every 12 hours  lactated ringers. 1000 milliLiter(s) IV Continuous <Continuous>  magnesium sulfate Infusion 2 Gm/Hr IV Continuous <Continuous> Patient evaluated at bedside this morning, resting comfortable in bed. BPs mildly elevated overnight.  She reports pain is well controlled.  She denies toxic PEC symptoms including headache, visual changes (including scotoma), chest pain, shortness of breath, numbness/tingling in hands or feet.  She also denies dizziness, palpitations, nausea, vomiting, heavy vaginal bleeding or perineal discomfort.   Reports decrease in amount of vaginal bleeding and denies clots.  She has been ambulating without assistance, voiding spontaneously, and is breastfeeding.   Tolerating food well, without nausea/vomit.  Passing flatus.     Physical Exam:  T(C): 36.4 (10-22-19 @ 06:05), Max: 36.7 (10-21-19 @ 21:40)  HR: 82 (10-22-19 @ 06:05) (72 - 82)  BP: 138/82 (10-22-19 @ 06:05) (138/82 - 155/89)  RR: 18 (10-22-19 @ 06:05) (18 - 20)  SpO2: 96% (10-22-19 @ 06:05) (96% - 99%)    GA: NAD, A&O x 3  CV: RRR, no murmurs, rubs, or gallops  Pulm: clear breath sounds throughout, no rales, rhonchi, wheezes  Abd: + BS, soft, nontender, nondistended, no rebound or guarding, uterus firm at midline, and 2fb below umbilicus  Perineum: intact, minimal swelling, small amount of bleeding on pad, no clots  Extremities: no swelling or calf tenderness                          12.1   5.92  )-----------( 347      ( 21 Oct 2019 13:06 )             36.9     10-22    135  |  101  |  12  ----------------------------<  86  3.7   |  22  |  0.82    Ca    7.4<L>      22 Oct 2019 02:36  Mg     6.3     10-22    TPro  6.3  /  Alb  3.4  /  TBili  0.3  /  DBili  x   /  AST  27  /  ALT  39  /  AlkPhos  131<H>  10-22    acetaminophen   Tablet .. 975 milliGRAM(s) Oral every 6 hours PRN  labetalol 200 milliGRAM(s) Oral every 12 hours  lactated ringers. 1000 milliLiter(s) IV Continuous <Continuous>  magnesium sulfate Infusion 2 Gm/Hr IV Continuous <Continuous>

## 2019-10-22 NOTE — PROGRESS NOTE ADULT - SUBJECTIVE AND OBJECTIVE BOX
Patient with severe range /95, 200 labetalol PO given early (next dose was scheduled for 1AM), repeat 166/82. 10mg IV hydralazine pushed at 11:04. WIll repeat BP in 20 minutes.  Patient asymptomatic at this time. Denies HA, spots in vision, n/v, RUQ pain.

## 2019-10-23 LAB
ALBUMIN SERPL ELPH-MCNC: 3.2 G/DL — LOW (ref 3.3–5)
ALBUMIN SERPL ELPH-MCNC: 3.2 G/DL — LOW (ref 3.3–5)
ALBUMIN SERPL ELPH-MCNC: 3.3 G/DL — SIGNIFICANT CHANGE UP (ref 3.3–5)
ALP SERPL-CCNC: 134 U/L — HIGH (ref 40–120)
ALP SERPL-CCNC: 137 U/L — HIGH (ref 40–120)
ALP SERPL-CCNC: 142 U/L — HIGH (ref 40–120)
ALT FLD-CCNC: 30 U/L — SIGNIFICANT CHANGE UP (ref 10–45)
ALT FLD-CCNC: 33 U/L — SIGNIFICANT CHANGE UP (ref 10–45)
ALT FLD-CCNC: 35 U/L — SIGNIFICANT CHANGE UP (ref 10–45)
ANION GAP SERPL CALC-SCNC: 11 MMOL/L — SIGNIFICANT CHANGE UP (ref 5–17)
ANION GAP SERPL CALC-SCNC: 12 MMOL/L — SIGNIFICANT CHANGE UP (ref 5–17)
ANION GAP SERPL CALC-SCNC: 8 MMOL/L — SIGNIFICANT CHANGE UP (ref 5–17)
APTT BLD: 29.1 SEC — SIGNIFICANT CHANGE UP (ref 27.5–36.3)
APTT BLD: 29.8 SEC — SIGNIFICANT CHANGE UP (ref 27.5–36.3)
AST SERPL-CCNC: 20 U/L — SIGNIFICANT CHANGE UP (ref 10–40)
AST SERPL-CCNC: 22 U/L — SIGNIFICANT CHANGE UP (ref 10–40)
AST SERPL-CCNC: 23 U/L — SIGNIFICANT CHANGE UP (ref 10–40)
BASOPHILS # BLD AUTO: 0.01 K/UL — SIGNIFICANT CHANGE UP (ref 0–0.2)
BASOPHILS # BLD AUTO: 0.01 K/UL — SIGNIFICANT CHANGE UP (ref 0–0.2)
BASOPHILS NFR BLD AUTO: 0.2 % — SIGNIFICANT CHANGE UP (ref 0–2)
BASOPHILS NFR BLD AUTO: 0.2 % — SIGNIFICANT CHANGE UP (ref 0–2)
BILIRUB SERPL-MCNC: 0.3 MG/DL — SIGNIFICANT CHANGE UP (ref 0.2–1.2)
BILIRUB SERPL-MCNC: 0.4 MG/DL — SIGNIFICANT CHANGE UP (ref 0.2–1.2)
BILIRUB SERPL-MCNC: 0.5 MG/DL — SIGNIFICANT CHANGE UP (ref 0.2–1.2)
BUN SERPL-MCNC: 10 MG/DL — SIGNIFICANT CHANGE UP (ref 7–23)
BUN SERPL-MCNC: 12 MG/DL — SIGNIFICANT CHANGE UP (ref 7–23)
BUN SERPL-MCNC: 13 MG/DL — SIGNIFICANT CHANGE UP (ref 7–23)
CALCIUM SERPL-MCNC: 7.7 MG/DL — LOW (ref 8.4–10.5)
CALCIUM SERPL-MCNC: 8 MG/DL — LOW (ref 8.4–10.5)
CALCIUM SERPL-MCNC: 8.8 MG/DL — SIGNIFICANT CHANGE UP (ref 8.4–10.5)
CHLORIDE SERPL-SCNC: 105 MMOL/L — SIGNIFICANT CHANGE UP (ref 96–108)
CHLORIDE SERPL-SCNC: 107 MMOL/L — SIGNIFICANT CHANGE UP (ref 96–108)
CHLORIDE SERPL-SCNC: 107 MMOL/L — SIGNIFICANT CHANGE UP (ref 96–108)
CO2 SERPL-SCNC: 21 MMOL/L — LOW (ref 22–31)
CO2 SERPL-SCNC: 22 MMOL/L — SIGNIFICANT CHANGE UP (ref 22–31)
CO2 SERPL-SCNC: 22 MMOL/L — SIGNIFICANT CHANGE UP (ref 22–31)
CREAT SERPL-MCNC: 0.72 MG/DL — SIGNIFICANT CHANGE UP (ref 0.5–1.3)
CREAT SERPL-MCNC: 0.75 MG/DL — SIGNIFICANT CHANGE UP (ref 0.5–1.3)
CREAT SERPL-MCNC: 0.86 MG/DL — SIGNIFICANT CHANGE UP (ref 0.5–1.3)
EOSINOPHIL # BLD AUTO: 0.12 K/UL — SIGNIFICANT CHANGE UP (ref 0–0.5)
EOSINOPHIL # BLD AUTO: 0.2 K/UL — SIGNIFICANT CHANGE UP (ref 0–0.5)
EOSINOPHIL NFR BLD AUTO: 1.8 % — SIGNIFICANT CHANGE UP (ref 0–6)
EOSINOPHIL NFR BLD AUTO: 3.8 % — SIGNIFICANT CHANGE UP (ref 0–6)
FIBRINOGEN PPP-MCNC: 406 MG/DL — SIGNIFICANT CHANGE UP (ref 258–438)
FIBRINOGEN PPP-MCNC: 439 MG/DL — HIGH (ref 258–438)
GLUCOSE SERPL-MCNC: 79 MG/DL — SIGNIFICANT CHANGE UP (ref 70–99)
GLUCOSE SERPL-MCNC: 84 MG/DL — SIGNIFICANT CHANGE UP (ref 70–99)
GLUCOSE SERPL-MCNC: 89 MG/DL — SIGNIFICANT CHANGE UP (ref 70–99)
HCT VFR BLD CALC: 36.4 % — SIGNIFICANT CHANGE UP (ref 34.5–45)
HCT VFR BLD CALC: 36.4 % — SIGNIFICANT CHANGE UP (ref 34.5–45)
HCT VFR BLD CALC: 36.9 % — SIGNIFICANT CHANGE UP (ref 34.5–45)
HGB BLD-MCNC: 12 G/DL — SIGNIFICANT CHANGE UP (ref 11.5–15.5)
HGB BLD-MCNC: 12.2 G/DL — SIGNIFICANT CHANGE UP (ref 11.5–15.5)
HGB BLD-MCNC: 12.3 G/DL — SIGNIFICANT CHANGE UP (ref 11.5–15.5)
IMM GRANULOCYTES NFR BLD AUTO: 0.2 % — SIGNIFICANT CHANGE UP (ref 0–1.5)
IMM GRANULOCYTES NFR BLD AUTO: 0.5 % — SIGNIFICANT CHANGE UP (ref 0–1.5)
INR BLD: 0.9 — SIGNIFICANT CHANGE UP (ref 0.88–1.16)
INR BLD: 0.91 — SIGNIFICANT CHANGE UP (ref 0.88–1.16)
LDH SERPL L TO P-CCNC: 265 U/L — HIGH (ref 50–242)
LDH SERPL L TO P-CCNC: 269 U/L — HIGH (ref 50–242)
LYMPHOCYTES # BLD AUTO: 1.19 K/UL — SIGNIFICANT CHANGE UP (ref 1–3.3)
LYMPHOCYTES # BLD AUTO: 1.54 K/UL — SIGNIFICANT CHANGE UP (ref 1–3.3)
LYMPHOCYTES # BLD AUTO: 22.5 % — SIGNIFICANT CHANGE UP (ref 13–44)
LYMPHOCYTES # BLD AUTO: 23.5 % — SIGNIFICANT CHANGE UP (ref 13–44)
MCHC RBC-ENTMCNC: 33 GM/DL — SIGNIFICANT CHANGE UP (ref 32–36)
MCHC RBC-ENTMCNC: 33.1 GM/DL — SIGNIFICANT CHANGE UP (ref 32–36)
MCHC RBC-ENTMCNC: 33.1 PG — SIGNIFICANT CHANGE UP (ref 27–34)
MCHC RBC-ENTMCNC: 33.1 PG — SIGNIFICANT CHANGE UP (ref 27–34)
MCHC RBC-ENTMCNC: 33.4 PG — SIGNIFICANT CHANGE UP (ref 27–34)
MCHC RBC-ENTMCNC: 33.8 GM/DL — SIGNIFICANT CHANGE UP (ref 32–36)
MCV RBC AUTO: 100 FL — SIGNIFICANT CHANGE UP (ref 80–100)
MCV RBC AUTO: 100.3 FL — HIGH (ref 80–100)
MCV RBC AUTO: 98.9 FL — SIGNIFICANT CHANGE UP (ref 80–100)
MONOCYTES # BLD AUTO: 0.51 K/UL — SIGNIFICANT CHANGE UP (ref 0–0.9)
MONOCYTES # BLD AUTO: 0.54 K/UL — SIGNIFICANT CHANGE UP (ref 0–0.9)
MONOCYTES NFR BLD AUTO: 8.2 % — SIGNIFICANT CHANGE UP (ref 2–14)
MONOCYTES NFR BLD AUTO: 9.6 % — SIGNIFICANT CHANGE UP (ref 2–14)
NEUTROPHILS # BLD AUTO: 3.38 K/UL — SIGNIFICANT CHANGE UP (ref 1.8–7.4)
NEUTROPHILS # BLD AUTO: 4.32 K/UL — SIGNIFICANT CHANGE UP (ref 1.8–7.4)
NEUTROPHILS NFR BLD AUTO: 63.7 % — SIGNIFICANT CHANGE UP (ref 43–77)
NEUTROPHILS NFR BLD AUTO: 65.8 % — SIGNIFICANT CHANGE UP (ref 43–77)
NRBC # BLD: 0 /100 WBCS — SIGNIFICANT CHANGE UP (ref 0–0)
PLATELET # BLD AUTO: 335 K/UL — SIGNIFICANT CHANGE UP (ref 150–400)
PLATELET # BLD AUTO: 341 K/UL — SIGNIFICANT CHANGE UP (ref 150–400)
PLATELET # BLD AUTO: 348 K/UL — SIGNIFICANT CHANGE UP (ref 150–400)
POTASSIUM SERPL-MCNC: 4 MMOL/L — SIGNIFICANT CHANGE UP (ref 3.5–5.3)
POTASSIUM SERPL-MCNC: 4.2 MMOL/L — SIGNIFICANT CHANGE UP (ref 3.5–5.3)
POTASSIUM SERPL-MCNC: 4.6 MMOL/L — SIGNIFICANT CHANGE UP (ref 3.5–5.3)
POTASSIUM SERPL-SCNC: 4 MMOL/L — SIGNIFICANT CHANGE UP (ref 3.5–5.3)
POTASSIUM SERPL-SCNC: 4.2 MMOL/L — SIGNIFICANT CHANGE UP (ref 3.5–5.3)
POTASSIUM SERPL-SCNC: 4.6 MMOL/L — SIGNIFICANT CHANGE UP (ref 3.5–5.3)
PROT SERPL-MCNC: 6.3 G/DL — SIGNIFICANT CHANGE UP (ref 6–8.3)
PROT SERPL-MCNC: 6.4 G/DL — SIGNIFICANT CHANGE UP (ref 6–8.3)
PROT SERPL-MCNC: 6.5 G/DL — SIGNIFICANT CHANGE UP (ref 6–8.3)
PROTHROM AB SERPL-ACNC: 10.1 SEC — SIGNIFICANT CHANGE UP (ref 10–12.9)
PROTHROM AB SERPL-ACNC: 10.3 SEC — SIGNIFICANT CHANGE UP (ref 10–12.9)
RBC # BLD: 3.63 M/UL — LOW (ref 3.8–5.2)
RBC # BLD: 3.68 M/UL — LOW (ref 3.8–5.2)
RBC # BLD: 3.69 M/UL — LOW (ref 3.8–5.2)
RBC # FLD: 13.8 % — SIGNIFICANT CHANGE UP (ref 10.3–14.5)
RBC # FLD: 14.1 % — SIGNIFICANT CHANGE UP (ref 10.3–14.5)
RBC # FLD: 14.2 % — SIGNIFICANT CHANGE UP (ref 10.3–14.5)
SODIUM SERPL-SCNC: 137 MMOL/L — SIGNIFICANT CHANGE UP (ref 135–145)
SODIUM SERPL-SCNC: 138 MMOL/L — SIGNIFICANT CHANGE UP (ref 135–145)
SODIUM SERPL-SCNC: 140 MMOL/L — SIGNIFICANT CHANGE UP (ref 135–145)
URATE SERPL-MCNC: 5.6 MG/DL — SIGNIFICANT CHANGE UP (ref 2.5–7)
URATE SERPL-MCNC: 5.9 MG/DL — SIGNIFICANT CHANGE UP (ref 2.5–7)
WBC # BLD: 5.3 K/UL — SIGNIFICANT CHANGE UP (ref 3.8–10.5)
WBC # BLD: 5.48 K/UL — SIGNIFICANT CHANGE UP (ref 3.8–10.5)
WBC # BLD: 6.56 K/UL — SIGNIFICANT CHANGE UP (ref 3.8–10.5)
WBC # FLD AUTO: 5.3 K/UL — SIGNIFICANT CHANGE UP (ref 3.8–10.5)
WBC # FLD AUTO: 5.48 K/UL — SIGNIFICANT CHANGE UP (ref 3.8–10.5)
WBC # FLD AUTO: 6.56 K/UL — SIGNIFICANT CHANGE UP (ref 3.8–10.5)

## 2019-10-23 RX ORDER — LABETALOL HCL 100 MG
300 TABLET ORAL EVERY 6 HOURS
Refills: 0 | Status: DISCONTINUED | OUTPATIENT
Start: 2019-10-23 | End: 2019-10-25

## 2019-10-23 RX ORDER — HYDRALAZINE HCL 50 MG
10 TABLET ORAL ONCE
Refills: 0 | Status: COMPLETED | OUTPATIENT
Start: 2019-10-23 | End: 2019-10-23

## 2019-10-23 RX ORDER — LABETALOL HCL 100 MG
20 TABLET ORAL ONCE
Refills: 0 | Status: COMPLETED | OUTPATIENT
Start: 2019-10-23 | End: 2019-10-23

## 2019-10-23 RX ORDER — NIFEDIPINE 30 MG
60 TABLET, EXTENDED RELEASE 24 HR ORAL EVERY 24 HOURS
Refills: 0 | Status: DISCONTINUED | OUTPATIENT
Start: 2019-10-24 | End: 2019-10-25

## 2019-10-23 RX ORDER — LABETALOL HCL 100 MG
40 TABLET ORAL ONCE
Refills: 0 | Status: COMPLETED | OUTPATIENT
Start: 2019-10-23 | End: 2019-10-23

## 2019-10-23 RX ORDER — NIFEDIPINE 30 MG
30 TABLET, EXTENDED RELEASE 24 HR ORAL EVERY 24 HOURS
Refills: 0 | Status: DISCONTINUED | OUTPATIENT
Start: 2019-10-23 | End: 2019-10-23

## 2019-10-23 RX ORDER — LABETALOL HCL 100 MG
80 TABLET ORAL ONCE
Refills: 0 | Status: COMPLETED | OUTPATIENT
Start: 2019-10-23 | End: 2019-10-23

## 2019-10-23 RX ORDER — HYDRALAZINE HCL 50 MG
5 TABLET ORAL ONCE
Refills: 0 | Status: COMPLETED | OUTPATIENT
Start: 2019-10-23 | End: 2019-10-23

## 2019-10-23 RX ORDER — NIFEDIPINE 30 MG
30 TABLET, EXTENDED RELEASE 24 HR ORAL ONCE
Refills: 0 | Status: COMPLETED | OUTPATIENT
Start: 2019-10-23 | End: 2019-10-23

## 2019-10-23 RX ADMIN — Medication 5 MILLIGRAM(S): at 21:18

## 2019-10-23 RX ADMIN — Medication 30 MILLIGRAM(S): at 23:24

## 2019-10-23 RX ADMIN — Medication 600 MILLIGRAM(S): at 12:41

## 2019-10-23 RX ADMIN — Medication 300 MILLIGRAM(S): at 06:54

## 2019-10-23 RX ADMIN — Medication 80 MILLIGRAM(S): at 22:15

## 2019-10-23 RX ADMIN — Medication 600 MILLIGRAM(S): at 18:52

## 2019-10-23 RX ADMIN — Medication 600 MILLIGRAM(S): at 19:45

## 2019-10-23 RX ADMIN — Medication 10 MILLIGRAM(S): at 22:57

## 2019-10-23 RX ADMIN — Medication 600 MILLIGRAM(S): at 13:30

## 2019-10-23 RX ADMIN — Medication 80 MILLIGRAM(S): at 20:41

## 2019-10-23 RX ADMIN — Medication 20 MILLIGRAM(S): at 19:35

## 2019-10-23 RX ADMIN — Medication 600 MILLIGRAM(S): at 06:56

## 2019-10-23 RX ADMIN — Medication 300 MILLIGRAM(S): at 14:52

## 2019-10-23 RX ADMIN — Medication 300 MILLIGRAM(S): at 20:45

## 2019-10-23 RX ADMIN — Medication 30 MILLIGRAM(S): at 21:18

## 2019-10-23 RX ADMIN — Medication 40 MILLIGRAM(S): at 19:56

## 2019-10-23 NOTE — PROGRESS NOTE ADULT - SUBJECTIVE AND OBJECTIVE BOX
Patient with severe range BP. 80 mg labetalol IV pushed at 22:15. Will repeat BP in 10 minutes. Patient currently asymptomatic. Patient continues to be asymptomatic  Labs wnl.

## 2019-10-23 NOTE — PROGRESS NOTE ADULT - SUBJECTIVE AND OBJECTIVE BOX
Patient with 2 severe range blood pressure. 10 mg IV hydralazine pushed at 12:14.   Patient asymptomatic at this time, denies toxic symptoms.   Will repeat BP in 20 minutes Patient with 2 severe range blood pressure. 10 mg IV hydralazine pushed at 12:14.   Patient asymptomatic at this time, denies toxic symptoms.   Will repeat BP in 20 minutes  f/u Stat full labs

## 2019-10-23 NOTE — CONSULT NOTE ADULT - ASSESSMENT
39y s/p  on 10/15 ,  readmitted for preeclampsia with severe features.     # Peripartum preeclampsia  - s/p  on 10/15  - On 10/22, she had been on labetalol 200 mg po q12 along with magnesium sulfate infusion.  - Today, she was noted to be having episodes of elevated BP in systolic 160s and 170s.   -  s/p multiple doses of Labetalol IV push, along with IV hydralazine, and Nifedipine 60 mg  - suggest keeping Labetalol at 300 q8h for now   - Continue Nifedipine at 30 mg po q12, if BP in severe range, can increase Nifed by 30 mg in am or pm dose as needed  - repeat PCR, and urinalysis  - reanl function stable  - CBC, LDH, LFTs noted - no evidence of hemolysis  - avoid NSAIDs

## 2019-10-23 NOTE — PROGRESS NOTE ADULT - SUBJECTIVE AND OBJECTIVE BOX
Patient with severe range BP. 80mg labetalol IV pushed at 20:41. Will repeat BP in 10 minutes. Patient currently asymptomatic  f/u stat labs Patient with severe range BP. 80mg labetalol IV pushed at 20:41. Will repeat BP in 10 minutes. Patient currently asymptomatic. 300mg PO labetalol given  f/u stat labs

## 2019-10-23 NOTE — CONSULT NOTE ADULT - SUBJECTIVE AND OBJECTIVE BOX
Patient is a 39y old  Female who presents with a chief complaint of Pre-eclampsia with severe features (23 Oct 2019 22:34)    Renal consult placed for management of peripartum preeclampsia      38yo  PPD6 s/p  at 38wks 4d on 10/15, was sent from OB, Dr. Werner's office for rule out preeclampsia. Pt had mild range blood pressures postpartum which was not treated with any blood pressure medications or magnesium. She was told to check her blood pressures at home and so she did. Pt states her blood pressures at home ranged from 150-170 systolic. She had a dull headache this morning which resolved after she had a bagel. She reports lower extremity swelling of bilateral legs that have not changed since delivery. Pt's prior pregnancies and postpartum course was uncomplicated. She is currently not breastfeeding .   Pt denies fever, chills, chest pain, SOB, abdominal pain, nausea, vomiting, scotomas, or blurry vision.     Nephrology consult was placed for peripartum preeclampsia.   On 10/22, she had been on labetalol 200 mg po q12 along with magnesium sulfate infusion.  Today, she was noted to be having episodes of elevated BP in systolic 160s and 170s.   She received multiple doses of Labetalol IV push, along with IV hydralazine, and Nifedipine 60 mg.    Patient was seen at bedside. BP currently in systolic 140s - 150s.  Patient denied any dizziness, blurred vision, abdominal pain.     OB/GYN Hx:      x4-, , ,     PMHx: Pt denies   SHx: Breast augmentation   Meds: Prenatal vitamins   Allergies: Pt denies       SOCIAL HISTORY: denied etoh, smoking      MEDICATIONS  (STANDING):  labetalol 300 milliGRAM(s) Oral every 6 hours  NIFEdipine XL 60 milliGRAM(s) Oral every 24 hours    MEDICATIONS  (PRN):  acetaminophen   Tablet .. 975 milliGRAM(s) Oral every 6 hours PRN Mild Pain (1 - 3)      Vital Signs Last 24 Hrs  T(C): 36.7 (23 Oct 2019 22:00), Max: 36.9 (23 Oct 2019 06:00)  T(F): 98.1 (23 Oct 2019 22:00), Max: 98.4 (23 Oct 2019 06:00)  HR: 88 (24 Oct 2019 00:00) (76 - 96)  BP: 152/78 (24 Oct 2019 00:00) (130/80 - 173/102)  BP(mean): --  RR: 16 (23 Oct 2019 23:17) (16 - 18)  SpO2: 99% (23 Oct 2019 23:17) (97% - 99%)    REVIEW OF SYSTEMS:  Gen: No fever  CVS: No chest pain  Resp: No shortness of breath  Abd: No nausea/ no vomiting/No abdominal pain  CNS: No headache      PHYSICAL EXAM:  GENERAL: NAD, well-developed, well nourished, alert, awake, no acute distress at present  NECK: Neck supple, No JVD  CHEST/LUNG: Clear to auscultation bilaterally; No wheeze, no rales, no crepitations  HEART: Regular rate and rhythm. GUILLAUME II/VI at LPSB, No gallop, no rub   ABDOMEN: Soft, Nontender, BS+nt, No flank tenderness.   EXTREMITIES: No clubbing, cyanosis, or edema, no calf tenderness  Neurology: AAOx3, no focal neurological deficit  SKIN: No rashes or lesions      CAPILLARY BLOOD GLUCOSE          I&O's Summary    22 Oct 2019 07:01  -  23 Oct 2019 07:00  --------------------------------------------------------  IN: 500 mL / OUT: 1600 mL / NET: -1100 mL    23 Oct 2019 07:01  -  24 Oct 2019 00:43  --------------------------------------------------------  IN: 0 mL / OUT: 600 mL / NET: -600 mL          LABS:                                                   10-23-19 @ 21:25    140  |  107  |  12  ----------------------------<  89  4.6   |  21<L>  |  0.86                                                 10-23-19 @ 07:43    137  |  107  |  10  ----------------------------<  79  4.0   |  22  |  0.75                                                 10-23-19 @ 00:47    138  |  105  |  13  ----------------------------<  84  4.2   |  22  |  0.72                                                 10-22-19 @ 08:53    136  |  100  |  10  ----------------------------<  92  4.2   |  24  |  0.79                                                 10-22-19 @ 02:36    135  |  101  |  12  ----------------------------<  86  3.7   |  22  |  0.82                                                 10-21-19 @ 13:06    140  |  106  |  13  ----------------------------<  104<H>  3.9   |  24  |  0.75    Ca    8.8      23 Oct 2019 21:25  Ca    8.0<L>      23 Oct 2019 07:43  Ca    7.7<L>      23 Oct 2019 00:47  Ca    7.4<L>      22 Oct 2019 08:53  Ca    7.4<L>      22 Oct 2019 02:36  Ca    8.8      21 Oct 2019 13:06  Mg     7.0     10-  Mg     6.3     10  Mg     5.3     10-21    TPro  6.5  /  Alb  3.2<L>  /  TBili  0.4  /  DBili  x   /  AST  20  /  ALT  30  /  AlkPhos  137<H>  10-23  TPro  6.3  /  Alb  3.2<L>  /  TBili  0.5  /  DBili  x   /  AST  22  /  ALT  33  /  AlkPhos  134<H>  10-23  TPro  6.4  /  Alb  3.3  /  TBili  0.3  /  DBili  x   /  AST  23  /  ALT  35  /  AlkPhos  142<H>  10  TPro  7.3  /  Alb  3.9  /  TBili  0.4  /  DBili  x   /  AST  31  /  ALT  44  /  AlkPhos  155<H>  10-22  TPro  6.3  /  Alb  3.4  /  TBili  0.3  /  DBili  x   /  AST  27  /  ALT  39  /  AlkPhos  131<H>  10-22  TPro  7.0  /  Alb  3.5  /  TBili  0.4  /  DBili  x   /  AST  43<H>  /  ALT  47<H>  /  AlkPhos  147<H>  10-21                          12.3   5.30  )-----------( 348      ( 23 Oct 2019 21:25 )             36.4     CBC Full  -  ( 23 Oct 2019 21:25 )  WBC Count : 5.30 K/uL  Hemoglobin : 12.3 g/dL  Hematocrit : 36.4 %  Platelet Count - Automated : 348 K/uL  Mean Cell Volume : 98.9 fl      CBC Full  -  ( 23 Oct 2019 07:43 )  WBC Count : 5.48 K/uL  Hemoglobin : 12.0 g/dL  Hematocrit : 36.4 %  Platelet Count - Automated : 335 K/uL  Mean Cell Volume : 100.3 fl      CBC Full  -  ( 23 Oct 2019 00:47 )  WBC Count : 6.56 K/uL  Hemoglobin : 12.2 g/dL  Hematocrit : 36.9 %  Platelet Count - Automated : 341 K/uL  Mean Cell Volume : 100.0 fl      CBC Full  -  ( 22 Oct 2019 08:53 )  WBC Count : 8.01 K/uL  Hemoglobin : 13.4 g/dL  Hematocrit : 40.0 %  Platelet Count - Automated : 378 K/uL  Mean Cell Volume : 99.5 fl      CBC Full  -  ( 21 Oct 2019 13:06 )  WBC Count : 5.92 K/uL  Hemoglobin : 12.1 g/dL  Hematocrit : 36.9 %  Platelet Count - Automated : 347 K/uL  Mean Cell Volume : 99.5 fl        PT/INR - ( 23 Oct 2019 21:25 )   PT: 10.3 sec;   INR: 0.91          PTT - ( 23 Oct 2019 21:25 )  PTT:29.1 sec          RADIOLOGY & ADDITIONAL TESTS:

## 2019-10-23 NOTE — PROGRESS NOTE ADULT - SUBJECTIVE AND OBJECTIVE BOX
10 minute Repeat BP from last push was severe at 173/102. 40mg labetalol IV pushed at 19:56. Will repeat BP in 10 minutes. Patient currently asymptomatic  f/u stat labs

## 2019-10-23 NOTE — PROGRESS NOTE ADULT - SUBJECTIVE AND OBJECTIVE BOX
Patient evaluated at bedside this morning, resting comfortable in bed. Pt had a severely elevated BP overnight requiring an IV push of Hydralizine 10mg. BP subsequently decreased to within mild range, and then normal range.  She reports pain is well controlled with tylenol and motrin  She denies toxic PEC symptoms including headache, visual changes (including scotoma), chest pain, shortness of breath, numbness/tingling in hands or feet.  She also denies dizziness, palpitations, nausea, vomiting, heavy vaginal bleeding or perineal discomfort.   Reports decrease in amount of vaginal bleeding and denies clots.  She has been ambulating without assistance, voiding spontaneously, and is breastfeeding.   Tolerating food well, without nausea/vomit.  Passing flatus.     Physical Exam:  T(C): 36.8 (10-23-19 @ 02:17), Max: 36.8 (10-23-19 @ 02:17)  HR: 90 (10-23-19 @ 02:17) (78 - 90)  BP: 131/81 (10-23-19 @ 03:15) (130/80 - 177/95)  RR: 18 (10-23-19 @ 02:17) (18 - 18)  SpO2: 98% (10-23-19 @ 02:17) (98% - 99%)    GA: NAD, A&O x 3  CV: RRR, no murmurs, rubs, or gallops  Pulm: clear breath sounds throughout, no rales, rhonchi, wheezes  Abd: + BS, soft, nontender, nondistended, no rebound or guarding, uterus firm at midline, and 2fb below umbilicus  Perineum: intact, minimal swelling, small amount of bleeding on pad, no clots  Extremities: no swelling or calf tenderness                          12.2   6.56  )-----------( 341      ( 23 Oct 2019 00:47 )             36.9     10-23    138  |  105  |  13  ----------------------------<  84  4.2   |  22  |  0.72    Ca    7.7<L>      23 Oct 2019 00:47  Mg     7.0     10-22    TPro  6.4  /  Alb  3.3  /  TBili  0.3  /  DBili  x   /  AST  23  /  ALT  35  /  AlkPhos  142<H>  10-23    acetaminophen   Tablet .. 975 milliGRAM(s) Oral every 6 hours PRN  hydrALAZINE Injectable 10 milliGRAM(s) IV Push once  ibuprofen  Tablet. 600 milliGRAM(s) Oral every 6 hours  labetalol 300 milliGRAM(s) Oral every 8 hours

## 2019-10-23 NOTE — PROGRESS NOTE ADULT - SUBJECTIVE AND OBJECTIVE BOX
Spoke with M, Dr. Cruz, reviewed blood pressures and IVP medications. Patient received maximum dose of labetalol IVP at 220mg. Can now receive hydralazine 10mg IVP x2, if needed. Patient remains asymptomatic. If any symptoms, plant to immediately start Magnesium, however given normal labs, no toxic complaints, and previously on Magnesium, no plans for Mg at this time, but low threshold to restart Magnesium if any changes in symptoms or labs. Oral regimen labetalol 300mg QID and Nifedipine 30mg qd.   Plan to consult nephrology for further recs at this time. Spoke with M, Dr. Cruz, reviewed blood pressures and IVP medications. Patient received maximum dose of labetalol IVP at 220mg. Can now receive hydralazine 10mg IVP x2, if needed. Patient remains asymptomatic. If any symptoms, plant to immediately start Magnesium, however given normal labs, no toxic complaints, and previously on Magnesium, no plans for Mg at this time, but low threshold to restart Magnesium if any changes in symptoms or labs. Oral regimen labetalol 300mg QID and Nifedipine 30mg qd.   Plan to consult nephrology for further recs at this time. Dr. Norton from nephrology called at this time, awaiting call back.

## 2019-10-23 NOTE — PROGRESS NOTE ADULT - ASSESSMENT
A/P 39y s/p , PPD #8, readmitted for PEC with severe features. Stable.  - Pain: well controlled  - HTN: denies any toxic PEC symptoms, BPs severely elevated overnight. S/p IVP Hydralizine 10mg. Antihypertensive regimen modified to Labetalol 300 TID. S/p magnesium.   - GI: Tolerating regular diet, colace PRN  - : urinating without difficulty/pain  -DVT prophylaxis: ambulating frequently  -Dispo: pending improvement in condition

## 2019-10-23 NOTE — PROGRESS NOTE ADULT - SUBJECTIVE AND OBJECTIVE BOX
Patient with 2 severe range blood pressure. 20 mg IV labetalol pushed at 19:25.   Patient asymptomatic at this time, denies toxic symptoms.   Will repeat BP in 10 minutes  f/u Stat full labs

## 2019-10-23 NOTE — PROGRESS NOTE ADULT - SUBJECTIVE AND OBJECTIVE BOX
Patient with severe range BP. 5mg hydralazine IV pushed at 21:20. Will repeat BP in 20 minutes. Patient currently asymptomatic. 30mg PO Nifedipine XL given. Patient continues to be asymptomatic  f/u stat labs

## 2019-10-24 LAB
MAGNESIUM SERPL-MCNC: 4.8 MG/DL — HIGH (ref 1.6–2.6)
MAGNESIUM SERPL-MCNC: 5.9 MG/DL — HIGH (ref 1.6–2.6)
MAGNESIUM SERPL-MCNC: 6 MG/DL — HIGH (ref 1.6–2.6)

## 2019-10-24 RX ORDER — METOCLOPRAMIDE HCL 10 MG
10 TABLET ORAL ONCE
Refills: 0 | Status: COMPLETED | OUTPATIENT
Start: 2019-10-24 | End: 2019-10-24

## 2019-10-24 RX ORDER — MAGNESIUM SULFATE 500 MG/ML
4 VIAL (ML) INJECTION ONCE
Refills: 0 | Status: COMPLETED | OUTPATIENT
Start: 2019-10-24 | End: 2019-10-24

## 2019-10-24 RX ORDER — MAGNESIUM SULFATE 500 MG/ML
1 VIAL (ML) INJECTION
Qty: 40 | Refills: 0 | Status: DISCONTINUED | OUTPATIENT
Start: 2019-10-24 | End: 2019-10-25

## 2019-10-24 RX ORDER — MAGNESIUM SULFATE 500 MG/ML
2 VIAL (ML) INJECTION
Qty: 40 | Refills: 0 | Status: DISCONTINUED | OUTPATIENT
Start: 2019-10-24 | End: 2019-10-24

## 2019-10-24 RX ORDER — SODIUM CHLORIDE 9 MG/ML
1000 INJECTION, SOLUTION INTRAVENOUS
Refills: 0 | Status: DISCONTINUED | OUTPATIENT
Start: 2019-10-24 | End: 2019-10-25

## 2019-10-24 RX ADMIN — Medication 975 MILLIGRAM(S): at 13:26

## 2019-10-24 RX ADMIN — Medication 200 GRAM(S): at 01:45

## 2019-10-24 RX ADMIN — Medication 300 MILLIGRAM(S): at 21:16

## 2019-10-24 RX ADMIN — Medication 975 MILLIGRAM(S): at 07:15

## 2019-10-24 RX ADMIN — Medication 300 MILLIGRAM(S): at 03:45

## 2019-10-24 RX ADMIN — Medication 975 MILLIGRAM(S): at 08:00

## 2019-10-24 RX ADMIN — Medication 300 MILLIGRAM(S): at 09:08

## 2019-10-24 RX ADMIN — SODIUM CHLORIDE 75 MILLILITER(S): 9 INJECTION, SOLUTION INTRAVENOUS at 15:00

## 2019-10-24 RX ADMIN — Medication 975 MILLIGRAM(S): at 01:45

## 2019-10-24 RX ADMIN — Medication 60 MILLIGRAM(S): at 23:43

## 2019-10-24 RX ADMIN — Medication 975 MILLIGRAM(S): at 14:00

## 2019-10-24 RX ADMIN — Medication 975 MILLIGRAM(S): at 01:05

## 2019-10-24 RX ADMIN — Medication 50 GM/HR: at 02:05

## 2019-10-24 RX ADMIN — Medication 300 MILLIGRAM(S): at 15:19

## 2019-10-24 RX ADMIN — Medication 10 MILLIGRAM(S): at 19:08

## 2019-10-24 NOTE — PROGRESS NOTE ADULT - ASSESSMENT
A&P:   39y  s/p    complicated by preeclampsia with severe features  No complaints currently. Normotensive since 2:00am.  Antihypertensives: Labetalol 300mg QID and Nifedipine 60mg qd.  Mg level is 4.8.    - Continue IV Magnesium @2G/hr untill    - Continue to monitor blood pressures  - Follow up on Magnesium serum levels. Next Magnesium check @ 13:45  - : strict Is and Os

## 2019-10-24 NOTE — PROGRESS NOTE ADULT - ASSESSMENT
40 yo s/p  on 10/15, readmitted for preeclampsia with severe features.     # Peripartum hypertension  - s/p  on 10/15  - on 10/22, she had been on labetalol 200 mg po q12 along with magnesium sulfate infusion  - 10/23 was noted to be having episodes of elevated BP in systolic 160s and 170s, s/p multiple doses of labetalol IV push, along with IV hydralazine, and nifedipine 60 mg  - currently on labetalol 300 mg po Q6hr and nifedipine XL 60 mg po Q24hr with SBP in 110-120-130's  - renal function stable  - please repeat PCR, and urinalysis  - CBC, LDH, LFTs noted - no evidence of hemolysis  - avoid NSAID's 40 yo s/p  on 10/15, readmitted for preeclampsia with severe features.     # Peripartum hypertension  - s/p  on 10/15  - on 10/22, she had been on labetalol 200 mg po q12 along with magnesium sulfate infusion  - 10/23 was noted to be having episodes of elevated BP in systolic 160s and 170s, s/p multiple doses of labetalol IV push, along with IV hydralazine, and nifedipine 60 mg  - currently on labetalol 300 mg po Q6hr and nifedipine XL 60 mg po Q24hr with SBP in 110-120-130's  - continue to monitor BP, goal 130-145/80-85 mmHg, hold antihypertensives if SBP <130, DBP <75 mmHg  - renal function stable, CBC, LDH, LFTs noted - no evidence of hemolysis  - please repeat PCR, and urinalysis  - avoid NSAID's    Discussed with attending Dr Bennett.

## 2019-10-24 NOTE — PROGRESS NOTE ADULT - ATTENDING COMMENTS
Chart reviewed at length.  Pt seen at bedside and case discussed on renal rounds.  Agree with note above.

## 2019-10-24 NOTE — PROGRESS NOTE ADULT - SUBJECTIVE AND OBJECTIVE BOX
Patient evaluated at bedside for clinical magnesium check. Serum magnesium was drawn and was 4.8. Headache has resolved.  She denies visual disturbances including scotoma,  right upper quadrant pain. Also denies nausea/vomiting/epigastric pain/shortness of breath. Pain well controlled.      T(C): 36.1 (10-24-19 @ 06:00), Max: 36.7 (10-23-19 @ 22:00)  HR: 80 (10-24-19 @ 08:00) (76 - 96)  BP: 127/79 (10-24-19 @ 08:00) (112/67 - 165/102)  RR: 16 (10-24-19 @ 08:00) (16 - 18)  SpO2: 97% (10-24-19 @ 08:00) (97% - 100%)  Wt(kg): --  Daily     Daily     10-23 @ 07:01  -  10-24 @ 07:00  --------------------------------------------------------  IN: 0 mL / OUT: 1900 mL / NET: -1900 mL      Gen: NAD, AAOx3  CV: RRR, no M/R/G  Pulm: CTAB, no R/R/W  Abd: soft, nontender, no rebound or guarding, no epigastric tenderness, liver nonpalpable +BS, fundus palpated   : Galvez in place  Ext: +1 edema norris, SCDs in place, Reflexes +2                        12.3   5.30  )-----------( 348      ( 23 Oct 2019 21:25 )             36.4     10-23    140  |  107  |  12  ----------------------------<  89  4.6   |  21<L>  |  0.86    Ca    8.8      23 Oct 2019 21:25  Mg     4.8     10-24    TPro  6.5  /  Alb  3.2<L>  /  TBili  0.4  /  DBili  x   /  AST  20  /  ALT  30  /  AlkPhos  137<H>  10-23    acetaminophen   Tablet .. 975 milliGRAM(s) Oral every 6 hours PRN  labetalol 300 milliGRAM(s) Oral every 6 hours  magnesium sulfate Infusion 2 Gm/Hr IV Continuous <Continuous>  NIFEdipine XL 60 milliGRAM(s) Oral every 24 hours      10-23-19 @ 07:01  -  10-24-19 @ 07:00  --------------------------------------------------------  IN: 0 mL / OUT: 1900 mL / NET: -1900 mL

## 2019-10-24 NOTE — PROGRESS NOTE ADULT - SUBJECTIVE AND OBJECTIVE BOX
Patient evaluated at bedside for clinical magnesium check. Serum magnesium was drawn at 14:30. She reports a mild headache.  She denies visual disturbances including scotoma and right upper quadrant pain. Also denies nausea/vomiting/epigastric pain/shortness of breath. Pain well controlled.      T(C): 36.2 (10-24-19 @ 10:00), Max: 36.2 (10-24-19 @ 10:00)  HR: 89 (10-24-19 @ 18:00) (78 - 89)  BP: 122/83 (10-24-19 @ 18:00) (119/77 - 144/86)  RR: 18 (10-24-19 @ 18:00) (16 - 18)  SpO2: 98% (10-24-19 @ 18:00) (96% - 99%)  Wt(kg): --  Daily     Daily     10-23 @ 07:01  -  10-24 @ 07:00  --------------------------------------------------------  IN: 0 mL / OUT: 1900 mL / NET: -1900 mL    10-24 @ 07:01  -  10-24 @ 18:10  --------------------------------------------------------  IN: 1125 mL / OUT: 2350 mL / NET: -1225 mL      Gen: NAD, AAOx3  CV: RRR, no M/R/G  Pulm: CTAB, no R/R/W  Abd: soft, nontender, no rebound or guarding, no epigastric tenderness, liver nonpalpable +BS, fundus palpated   : Galvez in place  Ext: +1 edema norris, SCDs in place, Reflexes ___                          12.3   5.30  )-----------( 348      ( 23 Oct 2019 21:25 )             36.4     10-23    140  |  107  |  12  ----------------------------<  89  4.6   |  21<L>  |  0.86    Ca    8.8      23 Oct 2019 21:25  Mg     6.0     10-24    TPro  6.5  /  Alb  3.2<L>  /  TBili  0.4  /  DBili  x   /  AST  20  /  ALT  30  /  AlkPhos  137<H>  10-23    acetaminophen   Tablet .. 975 milliGRAM(s) Oral every 6 hours PRN  labetalol 300 milliGRAM(s) Oral every 6 hours  lactated ringers. 1000 milliLiter(s) IV Continuous <Continuous>  magnesium sulfate Infusion 2 Gm/Hr IV Continuous <Continuous>  metoclopramide 10 milliGRAM(s) Oral once  NIFEdipine XL 60 milliGRAM(s) Oral every 24 hours      10-23-19 @ 07:01  -  10-24-19 @ 07:00  --------------------------------------------------------  IN: 0 mL / OUT: 1900 mL / NET: -1900 mL    10-24-19 @ 07:01  -  10-24-19 @ 18:10  --------------------------------------------------------  IN: 1125 mL / OUT: 2350 mL / NET: -1225 mL

## 2019-10-24 NOTE — PROGRESS NOTE ADULT - SUBJECTIVE AND OBJECTIVE BOX
Seen at bedside, reports 5/10 headache, given PO tylenol at this time for headache. Denies spots in visions, chest pain, shortness of breath, nausea/vomiting. Given severe range BP overnight and now newly found headache plan to start IV magnesium for seizure prophylaxis. Explained with patient and nursing staff. Continue to closely monitor symptoms. Blood pressures now 140s/80-90s. Continue with labetalol 300mg q6hrs and Nifedipine 60mg qhs. nephrology fellow evaluated and recommended that if BP remain elevated to add nifedipine 30mg in AM.   Dr. Hoskins aware.

## 2019-10-24 NOTE — PROGRESS NOTE ADULT - SUBJECTIVE AND OBJECTIVE BOX
Patient evaluated at bedside for clinical magnesium check. Overall feeling well. Still has a 4/10 headache on the left temple but she notes it is improving.   She denies visual disturbances including scotoma, and right upper quadrant pain. Also denies nausea/vomiting/epigastric pain/shortness of breath. Pain well controlled.      T(C): 36.2 (10-24-19 @ 10:00), Max: 36.2 (10-24-19 @ 10:00)  HR: 83 (10-24-19 @ 19:00) (78 - 89)  BP: 131/74 (10-24-19 @ 20:00) (119/77 - 144/86)  RR: 17 (10-24-19 @ 19:00) (17 - 18)  SpO2: 98% (10-24-19 @ 19:00) (96% - 99%)  Wt(kg): --    Gen: NAD  Pulm: CTAB no w/r/r  Abd: soft, nontender, no rebound or guarding, no epigastric tenderness, liver nonpalpable +BS, fundus palpated   Ext: Reflexes 2+ b/l brachioradialis                          12.3   5.30  )-----------( 348      ( 23 Oct 2019 21:25 )             36.4     10-23    140  |  107  |  12  ----------------------------<  89  4.6   |  21<L>  |  0.86    Ca    8.8      23 Oct 2019 21:25  Mg     6.0     10-24    TPro  6.5  /  Alb  3.2<L>  /  TBili  0.4  /  DBili  x   /  AST  20  /  ALT  30  /  AlkPhos  137<H>  10-23        10-23-19 @ 07:01  -  10-24-19 @ 07:00  --------------------------------------------------------  IN: 0 mL / OUT: 1900 mL / NET: -1900 mL    10-24-19 @ 07:01  -  10-24-19 @ 20:27  --------------------------------------------------------  IN: 1500 mL / OUT: 4050 mL / NET: -2550 mL

## 2019-10-24 NOTE — PROGRESS NOTE ADULT - ASSESSMENT
A&P:   39y  s/p , PP9, HD4 complicated by preeclampsia with severe features      1. Preeclampsia: Continue IV Magnesium @2G/hr for 24 hrs post delivery.  4/10 headache improving  Antihypertensives: Labetalol 300 qid, nifidepine 60qd  Continue to monitor blood pressures  D/C Mg at 1:45AM  Follow up on Magnesium serum level     2. GI: regular    3. : strict Is and Os,

## 2019-10-24 NOTE — PROGRESS NOTE ADULT - ASSESSMENT
A&P:   39y  s/p  complicated by preeclampsia with severe features  Headache currently. No severe range BP.  Antihypertensives: Labetalol 300QID, Nifedipine 60mg qd.  Mg level is 6.0.    - Continue IV Magnesium @2G/hr untill  1:45  - Continue to monitor blood pressures  - Follow up on Magnesium serum levels. Next Magnesium check @ 19:45  - : strict Is and Os

## 2019-10-24 NOTE — PROGRESS NOTE ADULT - SUBJECTIVE AND OBJECTIVE BOX
patient seen, evaluated and examined at bedside  on magnesium drip  labetalol 300 mg po Q6hr and nifedipine XL 60 mg po Q24hr  SBP in 110-120-130's  asymptomatic      Meds:    acetaminophen   Tablet .. 975 every 6 hours PRN  labetalol 300 every 6 hours  magnesium sulfate Infusion 2 <Continuous>  NIFEdipine XL 60 every 24 hours      T(C): , Max: 36.7 (10-23-19 @ 14:55)  T(F): , Max: 98.1 (10-23-19 @ 22:00)  HR: 80 (10-24-19 @ 08:00)  BP: 127/79 (10-24-19 @ 08:00)  BP(mean): --  RR: 16 (10-24-19 @ 08:00)  SpO2: 97% (10-24-19 @ 08:00)  Wt(kg): --    10-23 @ 07:01  -  10-24 @ 07:00  --------------------------------------------------------  IN: 0 mL / OUT: 1900 mL / NET: -1900 mL          REVIEW OF SYSTEMS:  Gen: No fever  CVS: No chest pain  Resp: No shortness of breath  Abd: No nausea/ no vomiting/No abdominal pain  CNS: No headache      PHYSICAL EXAM:  GENERAL: alert, no acute distress at present  NECK: Neck supple, No JVD  CHEST/LUNG: Clear to auscultation bilaterally  HEART: Regular rate and rhythm  ABDOMEN: Soft, Nontender, BS+nt, No flank tenderness  EXTREMITIES: LE edema, no calf tenderness bilateral  Neurology: AAOx3, no focal neurological deficit  SKIN: No rashes or lesions        LABS:                        12.3   5.30  )-----------( 348      ( 23 Oct 2019 21:25 )             36.4     10-23    140  |  107  |  12  ----------------------------<  89  4.6   |  21<L>  |  0.86    Ca    8.8      23 Oct 2019 21:25  Mg     4.8     10-24    TPro  6.5  /  Alb  3.2<L>  /  TBili  0.4  /  DBili  x   /  AST  20  /  ALT  30  /  AlkPhos  137<H>  10-23    Uric Acid, Serum: 5.6 mg/dL [2.5 - 7.0] (10-23 @ 21:25)    PT/INR - ( 23 Oct 2019 21:25 )   PT: 10.3 sec;   INR: 0.91          PTT - ( 23 Oct 2019 21:25 )  PTT:29.1 sec          RADIOLOGY & ADDITIONAL STUDIES:

## 2019-10-24 NOTE — PROGRESS NOTE ADULT - ASSESSMENT
Patient evaluated at bedside with her  by her side-  Dr. Aniyah Alexandre PGY4 present as well.  Pt denies any PEC sxs and she is current on magnesium sulfate for the second time due uncontrolled BP over night requiring multiple pushes of labetalol and hydralazine.  She reports pain is well controlled with tylenol.  She denies headache, dizziness, chest pain, palpitations, shortness of breathe, nausea, vomiting or heavy vaginal bleeding now.      Physical Exam:  Vital Signs Last 24 Hrs  T(C): 36.2 (24 Oct 2019 10:00), Max: 36.7 (23 Oct 2019 22:00)  T(F): 97.1 (24 Oct 2019 10:00), Max: 98.1 (23 Oct 2019 22:00)  HR: 88 (24 Oct 2019 11:00) (76 - 96)  BP: 130/79 (24 Oct 2019 11:00) (112/67 - 173/102)  BP(mean): 90 (24 Oct 2019 11:00) (90 - 99)  RR: 18 (24 Oct 2019 11:00) (16 - 18)  SpO2: 98% (24 Oct 2019 11:00) (96% - 100%)    GA: NAD, A+0 x 3  Breasts: soft, nontender, no palpable masses  Abd: + BS, soft, nontender, nondistended, no rebound or guarding, incision clean, dry and intact, uterus firm at midline,  fb below umbilicus  : lochia WNL  Extremities: no swelling or calf tenderness, reflexes +2 bilaterally    LABS:                        12.3   5.30  )-----------( 348      ( 23 Oct 2019 21:25 )             36.4     10-23    140  |  107  |  12  ----------------------------<  89  4.6   |  21<L>  |  0.86    Ca    8.8      23 Oct 2019 21:25  Mg     4.8     10-24    TPro  6.5  /  Alb  3.2<L>  /  TBili  0.4  /  DBili  x   /  AST  20  /  ALT  30  /  AlkPhos  137<H>  10-23    Magnesium, Serum: 4.8 mg/dL (10-24 @ 07:28)    PT/INR - ( 23 Oct 2019 21:25 )   PT: 10.3 sec;   INR: 0.91          PTT - ( 23 Oct 2019 21:25 )  PTT:29.1 sec    A/P:  POD #9 HD #4 with Preeclampsia w severe features on MGSO4 for the second time in stable condition currently  1) s/p MFM consultation w Dr. Cruz  2 cont medication labetalol 300 mg QID and nifedipine 60mg daily  3) appreciated the consult from nephrologist  4) detailed discussion w  and patient done at bedside--PGY4 aniyah Alexandre and nurse Lita Champagne present  5) cont MGSO4 for 24 hrs  6) Answered all questions

## 2019-10-25 ENCOUNTER — TRANSCRIPTION ENCOUNTER (OUTPATIENT)
Age: 39
End: 2019-10-25

## 2019-10-25 VITALS
HEART RATE: 96 BPM | RESPIRATION RATE: 14 BRPM | TEMPERATURE: 98 F | SYSTOLIC BLOOD PRESSURE: 101 MMHG | DIASTOLIC BLOOD PRESSURE: 64 MMHG | OXYGEN SATURATION: 98 %

## 2019-10-25 LAB — SURGICAL PATHOLOGY STUDY: SIGNIFICANT CHANGE UP

## 2019-10-25 PROCEDURE — 83615 LACTATE (LD) (LDH) ENZYME: CPT

## 2019-10-25 PROCEDURE — 84550 ASSAY OF BLOOD/URIC ACID: CPT

## 2019-10-25 PROCEDURE — 85730 THROMBOPLASTIN TIME PARTIAL: CPT

## 2019-10-25 PROCEDURE — 99285 EMERGENCY DEPT VISIT HI MDM: CPT | Mod: 25

## 2019-10-25 PROCEDURE — 85384 FIBRINOGEN ACTIVITY: CPT

## 2019-10-25 PROCEDURE — 85610 PROTHROMBIN TIME: CPT

## 2019-10-25 PROCEDURE — 80053 COMPREHEN METABOLIC PANEL: CPT

## 2019-10-25 PROCEDURE — 83735 ASSAY OF MAGNESIUM: CPT

## 2019-10-25 PROCEDURE — 36415 COLL VENOUS BLD VENIPUNCTURE: CPT

## 2019-10-25 PROCEDURE — 85027 COMPLETE CBC AUTOMATED: CPT

## 2019-10-25 PROCEDURE — 85025 COMPLETE CBC W/AUTO DIFF WBC: CPT

## 2019-10-25 PROCEDURE — 96374 THER/PROPH/DIAG INJ IV PUSH: CPT

## 2019-10-25 PROCEDURE — 96376 TX/PRO/DX INJ SAME DRUG ADON: CPT

## 2019-10-25 RX ORDER — NIFEDIPINE 30 MG
1 TABLET, EXTENDED RELEASE 24 HR ORAL
Qty: 14 | Refills: 0
Start: 2019-10-25 | End: 2019-11-07

## 2019-10-25 RX ORDER — NIFEDIPINE 30 MG
1 TABLET, EXTENDED RELEASE 24 HR ORAL
Qty: 0 | Refills: 0 | DISCHARGE
Start: 2019-10-25

## 2019-10-25 RX ORDER — DIPHENHYDRAMINE HCL 50 MG
25 CAPSULE ORAL EVERY 4 HOURS
Refills: 0 | Status: DISCONTINUED | OUTPATIENT
Start: 2019-10-25 | End: 2019-10-25

## 2019-10-25 RX ORDER — LABETALOL HCL 100 MG
1 TABLET ORAL
Qty: 56 | Refills: 0
Start: 2019-10-25 | End: 2019-11-07

## 2019-10-25 RX ORDER — LABETALOL HCL 100 MG
1 TABLET ORAL
Qty: 0 | Refills: 0 | DISCHARGE
Start: 2019-10-25

## 2019-10-25 RX ADMIN — Medication 25 MILLIGRAM(S): at 11:08

## 2019-10-25 RX ADMIN — Medication 300 MILLIGRAM(S): at 03:07

## 2019-10-25 RX ADMIN — Medication 300 MILLIGRAM(S): at 09:32

## 2019-10-25 NOTE — PROGRESS NOTE ADULT - SUBJECTIVE AND OBJECTIVE BOX
Patient is a 39y Female seen and evaluated at bedside.       Meds:    acetaminophen   Tablet .. 975 every 6 hours PRN  diphenhydrAMINE 25 every 4 hours PRN  labetalol 300 every 6 hours  lactated ringers. 1000 <Continuous>  NIFEdipine XL 60 every 24 hours      T(C): , Max: 37.2 (10-25-19 @ 03:08)  T(F): , Max: 98.9 (10-25-19 @ 03:08)  HR: 86 (10-25-19 @ 09:34)  BP: 123/73 (10-25-19 @ 09:34)  BP(mean): --  RR: 14 (10-25-19 @ 09:34)  SpO2: 98% (10-25-19 @ 09:34)  Wt(kg): --    10-24 @ 07:01  -  10-25 @ 07:00  --------------------------------------------------------  IN: 1500 mL / OUT: 5050 mL / NET: -3550 mL                LABS:                        12.3   5.30  )-----------( 348      ( 23 Oct 2019 21:25 )             36.4     10-23    140  |  107  |  12  ----------------------------<  89  4.6   |  21<L>  |  0.86    Ca    8.8      23 Oct 2019 21:25  Mg     5.9     10-24    TPro  6.5  /  Alb  3.2<L>  /  TBili  0.4  /  DBili  x   /  AST  20  /  ALT  30  /  AlkPhos  137<H>  10-23      PT/INR - ( 23 Oct 2019 21:25 )   PT: 10.3 sec;   INR: 0.91          PTT - ( 23 Oct 2019 21:25 )  PTT:29.1 sec          RADIOLOGY & ADDITIONAL STUDIES: no acute events overnight  no active complains at present  BP better controlled in 110-120's      Meds:    acetaminophen   Tablet .. 975 every 6 hours PRN  diphenhydrAMINE 25 every 4 hours PRN  labetalol 300 every 6 hours  lactated ringers. 1000 <Continuous>  NIFEdipine XL 60 every 24 hours      T(C): , Max: 37.2 (10-25-19 @ 03:08)  T(F): , Max: 98.9 (10-25-19 @ 03:08)  HR: 86 (10-25-19 @ 09:34)  BP: 123/73 (10-25-19 @ 09:34)  BP(mean): --  RR: 14 (10-25-19 @ 09:34)  SpO2: 98% (10-25-19 @ 09:34)  Wt(kg): --    10-24 @ 07:01  -  10-25 @ 07:00  --------------------------------------------------------  IN: 1500 mL / OUT: 5050 mL / NET: -3550 mL                LABS:                        12.3   5.30  )-----------( 348      ( 23 Oct 2019 21:25 )             36.4     10-23    140  |  107  |  12  ----------------------------<  89  4.6   |  21<L>  |  0.86    Ca    8.8      23 Oct 2019 21:25  Mg     5.9     10-24    TPro  6.5  /  Alb  3.2<L>  /  TBili  0.4  /  DBili  x   /  AST  20  /  ALT  30  /  AlkPhos  137<H>  10-23      PT/INR - ( 23 Oct 2019 21:25 )   PT: 10.3 sec;   INR: 0.91          PTT - ( 23 Oct 2019 21:25 )  PTT:29.1 sec          RADIOLOGY & ADDITIONAL STUDIES:

## 2019-10-25 NOTE — PROGRESS NOTE ADULT - REASON FOR ADMISSION
Pre-eclampsia with severe features

## 2019-10-25 NOTE — DIETITIAN INITIAL EVALUATION ADULT. - OTHER INFO
38 y/o female s/p  at 38weeks with pre-eclampsia features.Patient denied N/V/D or HA.Observed eating 100% and increased salty snacks at bedside.As per patient, does not use salt at home.Was eating a healthy diet previously.Patient pre-pregnancy weight is 121% of IBW.Patient hoping to be D/C 'd today

## 2019-10-25 NOTE — DISCHARGE NOTE OB - HOSPITAL COURSE
The patient was readmitted due to severely elevated blood pressures and a headache postpartum in the setting of eleveted LFTs (43/47) and Uric acid of 6.3. Protein/creatinine ratio at that time was 0.3 suggesting for preeclampsia.  She was treated with IV magnesium for 24 hours and required multiple IV pushes of Labetalol and nifedipine as well as constant PO antihypertensive drugs in order to control her blood pressures.   After 5 days of close treatment by both OB and nephrology team and antihypertensive medication adjustment the patient was found to be normotensive on the following regimen Labetalol 300mg QID and nifedipine XL 60mg qd.   Before discharge the patient reports resolution of her headache and denies any other complaints.   She is normotensive and stable for discharge with a visiting nurse service.

## 2019-10-25 NOTE — DISCHARGE NOTE OB - MEDICATION SUMMARY - MEDICATIONS TO TAKE
I will START or STAY ON the medications listed below when I get home from the hospital:    labetalol 300 mg oral tablet  -- 1 tab(s) by mouth every 6 hours  -- Indication: For Hypertension, unspecified type    NIFEdipine 60 mg oral tablet, extended release  -- 1 tab(s) by mouth every 24 hours  -- Indication: For Hypertension, unspecified type

## 2019-10-25 NOTE — DISCHARGE NOTE OB - PATIENT PORTAL LINK FT
You can access the FollowMyHealth Patient Portal offered by [facility] by registering at the following website: [email or web address]. By joining [facility]’s FollowMyHealth portal, you will also be able to view your health information using other applications (apps) compatible with our system.

## 2019-10-25 NOTE — PROGRESS NOTE ADULT - ASSESSMENT
40 yo s/p  on 10/15, readmitted for preeclampsia with severe features.     # Peripartum hypertension  - s/p  on 10/15  - s/p magnesium sulfate drip  - on labetalol 300 mg po Q6hr and nifedipine XL 60 mg po Q24hr with SBP in 110-120's  - continue to monitor BP, goal 120-130/80-85 mmHg  - renal function stable, CBC, LDH, LFTs noted - no evidence of hemolysis  - avoid NSAID's  - discharge plan: check BP four times a day at home before taking labetalol, if SBP <120 mmHg take 1/2 pill; follow up with Nephrology clinic in 2-3 days    Discussed with attending Dr Bennett.

## 2019-10-25 NOTE — DISCHARGE NOTE OB - PLAN OF CARE
Normotensive, no preeclamptic complaints. Monitor blood pressure twice daily. Document blood pressures to review with obstetrician at follow-up appointment. Return to hospital for systolic blood pressure (top number) equal to or greater than 160 AND/OR diastolic blood pressure (bottom number)equal to or greater than 110 OR any of the following symptoms: changes in vision, headache not relieved with Tylenol, severe abdominal pain, vomiting, increased vaginal bleeding, chest pain or shortness of breath. Call obstetrician for persistent systolic blood pressure (top number) equal to or greater than 150 AND/OR diastolic blood pressure (bottom number) equal to or greater than 100.

## 2019-10-25 NOTE — DISCHARGE NOTE OB - CARE PROVIDER_API CALL
Matthew Artis)  Obstetrics and Gynecology  [address] [id number]  Phone: [phone]  Fax: [phone]  Follow Up Time:

## 2019-10-25 NOTE — DISCHARGE NOTE OB - CARE PLAN
Principal Discharge DX:	Preeclampsia in postpartum period  Goal:	Normotensive, no preeclamptic complaints.  Assessment and plan of treatment:	Monitor blood pressure twice daily. Document blood pressures to review with obstetrician at follow-up appointment. Return to hospital for systolic blood pressure (top number) equal to or greater than 160 AND/OR diastolic blood pressure (bottom number)equal to or greater than 110 OR any of the following symptoms: changes in vision, headache not relieved with Tylenol, severe abdominal pain, vomiting, increased vaginal bleeding, chest pain or shortness of breath. Call obstetrician for persistent systolic blood pressure (top number) equal to or greater than 150 AND/OR diastolic blood pressure (bottom number) equal to or greater than 100.

## 2019-10-28 DIAGNOSIS — R94.5 ABNORMAL RESULTS OF LIVER FUNCTION STUDIES: ICD-10-CM

## 2019-10-28 DIAGNOSIS — O99.89 OTHER SPECIFIED DISEASES AND CONDITIONS COMPLICATING PREGNANCY, CHILDBIRTH AND THE PUERPERIUM: ICD-10-CM

## 2020-02-12 NOTE — DIETITIAN INITIAL EVALUATION ADULT. - ENERGY NEEDS
Ht:5ft 4inches,IBW:120lbs +/-10%,Pre-pregnancy weight :145lbs 121% of IBW  Needs are IBW:55kg [id number].Patient is not breast feeding.Protein needs :55gmprotein and 35-40cc fluids:1650-2200cc fluids
18-Jul-2019

## 2021-12-21 ENCOUNTER — EMERGENCY (EMERGENCY)
Facility: HOSPITAL | Age: 41
LOS: 1 days | Discharge: HOME | End: 2021-12-21
Admitting: EMERGENCY MEDICINE
Payer: COMMERCIAL

## 2021-12-21 VITALS
HEART RATE: 92 BPM | TEMPERATURE: 98 F | HEIGHT: 64 IN | OXYGEN SATURATION: 99 % | RESPIRATION RATE: 18 BRPM | DIASTOLIC BLOOD PRESSURE: 76 MMHG | SYSTOLIC BLOOD PRESSURE: 113 MMHG

## 2021-12-21 LAB — SARS-COV-2 RNA SPEC QL NAA+PROBE: DETECTED

## 2021-12-21 PROCEDURE — 87635 SARS-COV-2 COVID-19 AMP PRB: CPT

## 2021-12-21 PROCEDURE — 99282 EMERGENCY DEPT VISIT SF MDM: CPT

## 2021-12-21 PROCEDURE — 99283 EMERGENCY DEPT VISIT LOW MDM: CPT

## 2021-12-21 NOTE — ED PROVIDER NOTE - PATIENT PORTAL LINK FT
You can access the FollowMyHealth Patient Portal offered by [facility] by registering at the following website: [email or web address]. By joining [facility]’s FollowMyHealth portal, you will also be able to view your health information using other applications (apps) compatible with our system.

## 2021-12-23 DIAGNOSIS — U07.1 COVID-19: ICD-10-CM

## 2021-12-23 DIAGNOSIS — Z20.822 CONTACT WITH AND (SUSPECTED) EXPOSURE TO COVID-19: ICD-10-CM

## 2023-02-13 NOTE — ED ADULT NURSE NOTE - NSFALLRSKUNASSIST_ED_ALL_ED
Refill request for Bernice routed to Jaida RANGEL. Pt is no longer following with Dr. Cardenas.    no

## 2024-12-17 NOTE — ED PROVIDER NOTE - CROS ED HEME ALL NEG
Almita from Pharmacy is calling regarding Parag Cline prescription:    Medication: escitalopram  Dose: n.a  Quantity: n/a  Dosing Instructions: n/a  Refills requested: n/a    Following information being requested:  States there is a drug interaction with the trazodone. Needs to clarify  Urgency: STAT    Please call pharmacy back at   [facility] [address] [place] [address] [address] [facility]  [address] [facility] [address]  Phone: [phone] Fax: [phone]    Pharmacy was advised that the Clinic will call them back within 2 business days, unless this is a STAT request.     negative...

## 2025-02-11 NOTE — PATIENT PROFILE OB - POST PARTUM DEPRESSION SCREEN OB 2
For information on Fall & Injury Prevention, visit: [email or web address] OR  [email or web address] OR  https://www.cdc.gov/steadi/patient.html
no